# Patient Record
Sex: FEMALE | Race: WHITE
[De-identification: names, ages, dates, MRNs, and addresses within clinical notes are randomized per-mention and may not be internally consistent; named-entity substitution may affect disease eponyms.]

---

## 2019-06-07 ENCOUNTER — HOSPITAL ENCOUNTER (INPATIENT)
Dept: HOSPITAL 96 - M.ERS | Age: 79
LOS: 4 days | Discharge: NURSING, REHAB OR LONG-TERM CARE | DRG: 481 | End: 2019-06-11
Attending: INTERNAL MEDICINE | Admitting: INTERNAL MEDICINE
Payer: MEDICARE

## 2019-06-07 VITALS — BODY MASS INDEX: 26.43 KG/M2 | HEIGHT: 60.98 IN | WEIGHT: 140 LBS

## 2019-06-07 VITALS — DIASTOLIC BLOOD PRESSURE: 46 MMHG | SYSTOLIC BLOOD PRESSURE: 147 MMHG

## 2019-06-07 VITALS — SYSTOLIC BLOOD PRESSURE: 156 MMHG | DIASTOLIC BLOOD PRESSURE: 67 MMHG

## 2019-06-07 VITALS — DIASTOLIC BLOOD PRESSURE: 45 MMHG | SYSTOLIC BLOOD PRESSURE: 147 MMHG

## 2019-06-07 VITALS — DIASTOLIC BLOOD PRESSURE: 64 MMHG | SYSTOLIC BLOOD PRESSURE: 146 MMHG
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
## 2019-06-07 DIAGNOSIS — E78.00: ICD-10-CM
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
## 2019-06-07 DIAGNOSIS — D62: ICD-10-CM

## 2019-06-07 DIAGNOSIS — S92.352A: ICD-10-CM

## 2019-06-07 DIAGNOSIS — S92.332A: ICD-10-CM

## 2019-06-07 DIAGNOSIS — I95.9: ICD-10-CM

## 2019-06-07 DIAGNOSIS — Z90.49: ICD-10-CM

## 2019-06-07 DIAGNOSIS — S72.142A: Primary | ICD-10-CM

## 2019-06-07 DIAGNOSIS — S62.645A: ICD-10-CM

## 2019-06-07 DIAGNOSIS — Z88.1: ICD-10-CM

## 2019-06-07 DIAGNOSIS — E03.9: ICD-10-CM

## 2019-06-07 DIAGNOSIS — Z88.8: ICD-10-CM

## 2019-06-07 DIAGNOSIS — E11.9: ICD-10-CM

## 2019-06-07 DIAGNOSIS — I10: ICD-10-CM
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
## 2019-06-07 LAB
ABSOLUTE BASOPHILS: 0.1 THOU/UL (ref 0–0.2)
ABSOLUTE EOSINOPHILS: 0.2 THOU/UL (ref 0–0.7)
ABSOLUTE MONOCYTES: 0.8 THOU/UL (ref 0–1.2)
ALBUMIN SERPL-MCNC: 3.6 G/DL (ref 3.4–5)
ALP SERPL-CCNC: 80 U/L (ref 46–116)
ALT SERPL-CCNC: 20 U/L (ref 30–65)
ANION GAP SERPL CALC-SCNC: 8 MMOL/L (ref 7–16)
AST SERPL-CCNC: 18 U/L (ref 15–37)
BASOPHILS NFR BLD AUTO: 0.8 %
BILIRUB SERPL-MCNC: 0.3 MG/DL
BUN SERPL-MCNC: 24 MG/DL (ref 7–18)
CALCIUM SERPL-MCNC: 9.4 MG/DL (ref 8.5–10.1)
CHLORIDE SERPL-SCNC: 98 MMOL/L (ref 98–107)
CO2 SERPL-SCNC: 28 MMOL/L (ref 21–32)
CREAT SERPL-MCNC: 0.9 MG/DL (ref 0.6–1.3)
EOSINOPHIL NFR BLD: 1.7 %
GLUCOSE SERPL-MCNC: 167 MG/DL (ref 70–99)
GRANULOCYTES NFR BLD MANUAL: 69.5 %
HCT VFR BLD CALC: 34.7 % (ref 37–47)
HGB BLD-MCNC: 11.5 GM/DL (ref 12–15)
INR PPP: 1
LYMPHOCYTES # BLD: 2 THOU/UL (ref 0.8–5.3)
LYMPHOCYTES NFR BLD AUTO: 20 %
MCH RBC QN AUTO: 28.7 PG (ref 26–34)
MCHC RBC AUTO-ENTMCNC: 33.2 G/DL (ref 28–37)
MCV RBC: 86.2 FL (ref 80–100)
MONOCYTES NFR BLD: 8 %
MPV: 8.6 FL. (ref 7.2–11.1)
NEUTROPHILS # BLD: 7 THOU/UL (ref 1.6–8.1)
NUCLEATED RBCS: 0 /100WBC
PLATELET COUNT*: 287 THOU/UL (ref 150–400)
POTASSIUM SERPL-SCNC: 4.1 MMOL/L (ref 3.5–5.1)
PROT SERPL-MCNC: 8 G/DL (ref 6.4–8.2)
PROTHROMBIN TIME: 10.3 SECONDS (ref 9.2–11.5)
RBC # BLD AUTO: 4.03 MIL/UL (ref 4.2–5)
RDW-CV: 14.9 % (ref 10.5–14.5)
SODIUM SERPL-SCNC: 134 MMOL/L (ref 136–145)
TROPONIN-I LEVEL: <0.06 NG/ML (ref ?–0.06)
WBC # BLD AUTO: 10 THOU/UL (ref 4–11)

## 2019-06-07 PROCEDURE — 0QS704Z REPOSITION LEFT UPPER FEMUR WITH INTERNAL FIXATION DEVICE, OPEN APPROACH: ICD-10-PCS | Performed by: ORTHOPAEDIC SURGERY

## 2019-06-07 NOTE — NUR
PATIENT CAME TO THE FLOOR FROM THE ER VIA CART IN STABLE CONDITION. SLID
PATIENT FROM ER CART TO BED. ROOM ORIENTATION AND ADMISSION ASSESSMENT DONE.
FAMILY AT BEDSIDE. PATIENT WENT TO CT THEN WAS OING TO GO TO THE OPERATING
ROOM FROM CT. TRANSPORTED WITH PCA AND SURGEON. FAMILY IS WITH PATIENT.

## 2019-06-07 NOTE — NUR
PT TRANSFERED FROM PACU AT 2010. VITALS STABLE. ON 3L OF OXYGEN BY NC. ON
CONTINUOUS SAT MONITOR. FAMILY AT BEDSIDE. WILL CONTINUE TO MONITOR.

## 2019-06-07 NOTE — [place] [address]
Phone:  [phone]                     ELECTROCARDIOGRAM REPORT      
_______________________________________________________________________________
 
Name:       FRANDY DIAZ            Room:           [address]    ADM IN  
.R.#:  [id number]      Account #:      [id number]  
Admission:  19     Attend Phys:    ABNER Ocampo
Discharge:               Date of Birth:  [birth year]  
         Report #: [id number]
    [id number]
_______________________________________________________________________________
THIS REPORT FOR:  //name//                      
 
                         [facility] ED
                                       
Test Date:    2019               Test Time:    12:35:40
Pat Name:     FRANDY DIAZ               Department:   
Patient ID:   [id number]            Room:         [facility]
Gender:       F                        Technician:   MS
:          [id number]               Requested By: Curtis Mai
Order Number: [id number] MD:   Tip Penn
                                 Measurements
Intervals                              Axis          
Rate:         74                       P:            75
NM:           237                      QRS:          -34
QRSD:         100                      T:            111
QT:           437                                    
QTc:          485                                    
                           Interpretive Statements
Sinus rhythm
Prolonged NM interval
Left axis deviation
Abnormal R-wave progression, early transition
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
Abnormal T, consider ischemia, lateral leads
Compared to ECG 2014 11:07:31
First degree AV block now present
Left-axis deviation now present
Sinus arrhythmia no longer present
Myocardial infarct finding still present
 
Electronically Signed On 2019 16:42:09 CDT by Tip Penn
[email or web address]
 
 
 
 
 
 
 
 
 
 
 
  <ELECTRONICALLY SIGNED>
                                           By: Tip Penn MD, [facility]      
  19     1642
D: 19 1235   _____________________________________
T: 19 1235   Tip Penn MD, [facility]        /EPI

## 2019-06-08 VITALS — SYSTOLIC BLOOD PRESSURE: 143 MMHG | DIASTOLIC BLOOD PRESSURE: 54 MMHG

## 2019-06-08 VITALS — DIASTOLIC BLOOD PRESSURE: 54 MMHG | SYSTOLIC BLOOD PRESSURE: 138 MMHG

## 2019-06-08 VITALS — SYSTOLIC BLOOD PRESSURE: 143 MMHG | DIASTOLIC BLOOD PRESSURE: 59 MMHG

## 2019-06-08 VITALS — SYSTOLIC BLOOD PRESSURE: 142 MMHG | DIASTOLIC BLOOD PRESSURE: 57 MMHG

## 2019-06-08 VITALS — DIASTOLIC BLOOD PRESSURE: 62 MMHG | SYSTOLIC BLOOD PRESSURE: 129 MMHG

## 2019-06-08 LAB
HCT VFR BLD CALC: 27.3 % (ref 37–47)
HGB BLD-MCNC: 9 GM/DL (ref 12–15)

## 2019-06-08 NOTE — NUR
PT REMAINED ALERT AND ORIENTED. VITALS STABLE WITH 3L O2 BY NC. PAIN
CONTROLLED WITH OXY IR. NAUSEA RESOLVED AFTER IV ZOFRAN GIVEN. DRESSING ON LT
HIP CLEAN AND DRY. IV ANTIBIOTIC GIVEN. ICE PACK, TIMMONS IN PLACE. HOURLY
ROUNDING COMPLETED. WILL CONTINUE TO MONITOR.

## 2019-06-08 NOTE — NUR
PATIENT PLEASANT AND COOPERATIVE W/ ASSESS AND CARES. PATIENT STATES FEELING
NAUSEOUS THIS AM, SEE MAR. PATIENT DENIES NAUSEA THRU REST OF SHIFT. THERAPIES
WORK WITH PATIENT THIS SHIFT. PATIENT C/O PAIN IN LEFT FOOT, NOTED BRUISING,
PATIENT/FAMILY STATES WAS NOT PRESENT AT TIME OF FALL, DR NOTIFIED, XRAY ORDER
OBTAINED. NOTED BRUISING TO RIGHT HAND, PATIENT STATES 4TH FINGER PAINFUL.
FINGER BRENTON TAPED TO LIMIT BENDING. PATIENT STATES THIS HELPS. SEE MAR FOR
PAIN CONTROL. PATIENT TRANSFERS USING FWW/GAIT BELT W/ SBA. FAMILY MEMBERS
PRESENT IN RM THRU SHIFT. EDUCATED ON POC, INC SPIR, CDB AND MEDICATION
CHANGES. PATIENT STATES VERBALLY OF UNDERSTANDING. USING INC SPIR. LT HIP DRSG
NOTED WNL, CDI. ~MARANDARTOBI

## 2019-06-08 NOTE — NUR
SW met with pt and with pt dtr Lissy to complete initial assessment,
introduce self, and SW role.  Pt was alert, oriented, pleasant. Pt lives at
home with . Pt dtr Lissy lives in [place] and pt has another dtr
who lives in [place], [place].  Pt dtr explained that pt  is also 79 and that
"they do okay" but to be determined whether pt will need SNF or inpt rehab at
dc.  Pt open to the options if needed and also to HH at dc. (Pt does not have
hx with HH and pt  does but they do not recall the name of the agency.)
Pt has RW, cane, BSC, Shower chair and a tub/shower bench.  Pt has a ranch
style home with just 2 steps to enter and the steps from the garage into the
home is where pt fell.  Pt dtr aware pt will dc on Eliquis at least for a
couple of weeks.  SW/CM to continue to follow to assist with safe dc planning.

## 2019-06-09 VITALS — SYSTOLIC BLOOD PRESSURE: 147 MMHG | DIASTOLIC BLOOD PRESSURE: 50 MMHG

## 2019-06-09 VITALS — DIASTOLIC BLOOD PRESSURE: 48 MMHG | SYSTOLIC BLOOD PRESSURE: 116 MMHG

## 2019-06-09 VITALS — SYSTOLIC BLOOD PRESSURE: 135 MMHG | DIASTOLIC BLOOD PRESSURE: 47 MMHG

## 2019-06-09 VITALS — SYSTOLIC BLOOD PRESSURE: 124 MMHG | DIASTOLIC BLOOD PRESSURE: 61 MMHG

## 2019-06-09 VITALS — DIASTOLIC BLOOD PRESSURE: 60 MMHG | SYSTOLIC BLOOD PRESSURE: 150 MMHG

## 2019-06-09 NOTE — NUR
PATIENT HAS REMAINED ALERT AND ORIENTED X 4 THROUGHOUT THE SHIFT AND RESTING
QUIETLY ON HOURLY ROUNDS. Q2H TURNS ENCOURAGED. DRESSING LEFT HIP CLEAN AND
DRY. ICE PACK PROVIDED. MEDICATED FOR PAIN Q6H WITH SCHEDULED HYDROCODONE.
PATIENT HAS OPTED TO TAKE ONLY 1/2 TAB EACH TIME. ADEQUATE ORAL INTAKE AND
URINE OUTPUT. O2 RE-APPLIED AT HS FOR O2 SAT 88-90% ON ROOM AIR. FURTHER
CHECKS ON 2L/MIN 93-95%. OTHER VITAL SIGNS STABLE. ORTHO TO REVIEW LEFT FOOT
XRAYS IN AM WHICH DOES SHOW THIRD FIFTH METETARSAL FX OF INDETERMINATE AGE.
DAUGHTER AT BEDSIDE. CONTINUE TO MONITOR.

## 2019-06-09 NOTE — NUR
PLEASANT AND COOPERATIVE THRU SHIFT. FAMILY MEMBERS PRESENT IN  THRU MOST OF
SHIFT. SEE MAR FOR MED INTERVENTION. PATIENT TOOK OFF HAND BRACE STATING THAT
IT WAS TOO UNCOMFORTABLE, DR IN THIS MORNING FROM ORTHO GROUP, BRENTON TAPED
PATIENT FINGERS TOGETHER W/ PATIENT AGREEMENT. BOOT PLACED TO LEFT FOOT.
PATIENT INSTRUCTED BY ORTHO DR ON USE OF BOOT. PATIENT STATES VERBALLY OF
UNDERSTANDING. USING FWW AND GAIT BELT, SBA W/ TRANSFERS AND AMBULATION.
PATIENT NOTED HAVING DIFF W/ LLE. BM TODAY. IV CATH SITE NOTED WNL. CALL LIGHT
IN REACH. HRLY ROUNDS COMPLETED. ~TJRN

## 2019-06-10 VITALS — SYSTOLIC BLOOD PRESSURE: 138 MMHG | DIASTOLIC BLOOD PRESSURE: 54 MMHG

## 2019-06-10 VITALS — DIASTOLIC BLOOD PRESSURE: 66 MMHG | SYSTOLIC BLOOD PRESSURE: 156 MMHG

## 2019-06-10 VITALS — DIASTOLIC BLOOD PRESSURE: 55 MMHG | SYSTOLIC BLOOD PRESSURE: 157 MMHG

## 2019-06-10 VITALS — DIASTOLIC BLOOD PRESSURE: 65 MMHG | SYSTOLIC BLOOD PRESSURE: 172 MMHG

## 2019-06-10 VITALS — DIASTOLIC BLOOD PRESSURE: 56 MMHG | SYSTOLIC BLOOD PRESSURE: 170 MMHG

## 2019-06-10 LAB
ABSOLUTE BASOPHILS: 0.1 THOU/UL (ref 0–0.2)
ABSOLUTE EOSINOPHILS: 0.1 THOU/UL (ref 0–0.7)
ABSOLUTE MONOCYTES: 1 THOU/UL (ref 0–1.2)
ANION GAP SERPL CALC-SCNC: 9 MMOL/L (ref 7–16)
BASOPHILS NFR BLD AUTO: 0.5 %
BUN SERPL-MCNC: 16 MG/DL (ref 7–18)
CALCIUM SERPL-MCNC: 9.1 MG/DL (ref 8.5–10.1)
CHLORIDE SERPL-SCNC: 95 MMOL/L (ref 98–107)
CO2 SERPL-SCNC: 30 MMOL/L (ref 21–32)
CREAT SERPL-MCNC: 1 MG/DL (ref 0.6–1.3)
EOSINOPHIL NFR BLD: 0.5 %
GLUCOSE SERPL-MCNC: 288 MG/DL (ref 70–99)
GRANULOCYTES NFR BLD MANUAL: 77 %
HCT VFR BLD CALC: 27.7 % (ref 37–47)
HGB BLD-MCNC: 9.2 GM/DL (ref 12–15)
LYMPHOCYTES # BLD: 1.2 THOU/UL (ref 0.8–5.3)
LYMPHOCYTES NFR BLD AUTO: 12.5 %
MCH RBC QN AUTO: 28.7 PG (ref 26–34)
MCHC RBC AUTO-ENTMCNC: 33.2 G/DL (ref 28–37)
MCV RBC: 86.6 FL (ref 80–100)
MONOCYTES NFR BLD: 9.5 %
MPV: 8.8 FL. (ref 7.2–11.1)
NEUTROPHILS # BLD: 7.7 THOU/UL (ref 1.6–8.1)
NUCLEATED RBCS: 0 /100WBC
PLATELET COUNT*: 252 THOU/UL (ref 150–400)
POTASSIUM SERPL-SCNC: 4.1 MMOL/L (ref 3.5–5.1)
RBC # BLD AUTO: 3.2 MIL/UL (ref 4.2–5)
RDW-CV: 14.5 % (ref 10.5–14.5)
SODIUM SERPL-SCNC: 134 MMOL/L (ref 136–145)
WBC # BLD AUTO: 10 THOU/UL (ref 4–11)

## 2019-06-10 NOTE — NUR
ASSUMED CARE OF PATIENT AT 1515. PATIENT RESTING IN BED. PATIENT IS UP WITH
MINIMAL ASSIST WITH WALKER/GAITBELT FOR TRANSFER. PATIENT HAS HAD COMPLAINTS
OF PAIN, TREATED ADEQUATELY WITH HYDROCODONE. PATIENT HAS GOOD APPETITE.
PATIENT DENIES ANY NEEDS AT THIS TIME. CALL LIGHT WITHIN REACH. WILL CONTINUE
TO MONITOR.

## 2019-06-10 NOTE — NUR
PATIENT IS ALERT AND ORIENTED TODAY VERY PLEASANT. VITAL SIGNS STABLE ON ROOM
AIR. SOME COMPLAINTS OF PAIN THAT IS WELL CONTROLLED WITH ORAL PAIN
MEDICATIONS. UP WITH WALKER AND GAIT BELT TO BEDSIDE COMMODE, TOERATING
THERAPY WELL. WILL PASS ON REPORT TO NEXT NURSE AND CONTIMUE TO MONITOR.

## 2019-06-10 NOTE — NUR
AWAITNG 'S VISIT. DISCUSSED WITH PT.PLAN B. IF DOES NOT FEEL SHE
WOULOD BE A GOOD REHAB CADIDATE, SHE WOULD NEED TO GO TO SNF. GAVE HER
PAMPHLET OF SNFS IN [place]. SHE WILL LOOK AT IT AND WANTS TO TALK WITH HER
FAMILY. TOLD HER SHE COULD POSSIBLY BE DISCHARGED TOMORROW EITHER TO REAHB OR
SNF.

## 2019-06-10 NOTE — NUR
PATIENT HAS REMAINED ALERT AND ORIENTED X 4 THROUGHOUT THE SHIFT AND RESTING
QUIETLY ON HOURLY ROUNDS. MEDICATED FOR PAIN X 2 TO GOOD EFFECT. DRESSING LEFT
HIP CLEAN AND DRY. REFUSED ICE PACK. DOMENICO HOSE AND SCD'S ON BILAT LE'S.
REFUSED SEVERAL Q2H TURNS. DID GET UP OUT OF BED X 2 TO BSC FOR VOIDS. ABLE TO
MAKE SMALL ADJUSTMENTS BY SELF. USING GAIT BELT PER PT INSTRUCTION TO ASSIST
MOVING LLE FROM SUPINE TO SITTING POSITION. ACROSS BED. MIN ASSIST WITH
WALKER, GAIT BELT AND POST-OP SHOE TO L FOOT TO COMMODE.  VITAL SIGNS STABLE.
CONTINUE TO MONITOR.

## 2019-06-11 ENCOUNTER — HOSPITAL ENCOUNTER (INPATIENT)
Dept: HOSPITAL 96 - M.REH | Age: 79
LOS: 10 days | Discharge: HOME HEALTH | DRG: 536 | End: 2019-06-21
Attending: PHYSICAL MEDICINE & REHABILITATION | Admitting: PHYSICAL MEDICINE & REHABILITATION
Payer: MEDICARE

## 2019-06-11 VITALS — DIASTOLIC BLOOD PRESSURE: 59 MMHG | SYSTOLIC BLOOD PRESSURE: 151 MMHG

## 2019-06-11 VITALS — SYSTOLIC BLOOD PRESSURE: 152 MMHG | DIASTOLIC BLOOD PRESSURE: 49 MMHG

## 2019-06-11 VITALS — HEIGHT: 60.98 IN | WEIGHT: 132 LBS | BODY MASS INDEX: 24.92 KG/M2

## 2019-06-11 VITALS — DIASTOLIC BLOOD PRESSURE: 49 MMHG | SYSTOLIC BLOOD PRESSURE: 152 MMHG

## 2019-06-11 DIAGNOSIS — F17.210: ICD-10-CM
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
## 2019-06-11 DIAGNOSIS — E83.42: ICD-10-CM
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
## 2019-06-11 DIAGNOSIS — Z79.899: ICD-10-CM

## 2019-06-11 DIAGNOSIS — S72.142A: Primary | ICD-10-CM

## 2019-06-11 DIAGNOSIS — E11.9: ICD-10-CM

## 2019-06-11 DIAGNOSIS — S92.332A: ICD-10-CM

## 2019-06-11 DIAGNOSIS — I10: ICD-10-CM

## 2019-06-11 DIAGNOSIS — Z88.8: ICD-10-CM

## 2019-06-11 DIAGNOSIS — E03.9: ICD-10-CM

## 2019-06-11 DIAGNOSIS — Z79.84: ICD-10-CM
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
## 2019-06-11 DIAGNOSIS — Z88.1: ICD-10-CM
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
## 2019-06-11 DIAGNOSIS — E78.00: ICD-10-CM

## 2019-06-11 DIAGNOSIS — S92.344A: ICD-10-CM

## 2019-06-11 DIAGNOSIS — Z90.49: ICD-10-CM

## 2019-06-11 NOTE — PATH
[address]  [address]                    PATHOLOGY RPT PROCEDURE       
_______________________________________________________________________________
 
Name:       FRANDY DIAZ            Room:           [facility] [place]  
.R.#:  [id number]      Account #:      [id number]  
Admission:  06/07/19     Date of Birth:  [birth year]  
Discharge:                             Report #:    [id number]
                                                         Path Case #: [id number]
_______________________________________________________________________________
 
[facility] Accession Number: [id number]
.                                                                01
Material submitted:                                        .
femur - LEFT FEMUR BONE REAMINGS. Modifiers: left
.                                                                01
Clinical history:                                          .
Left convoluted intertrochanteric, subtrochanteric fracture
.                                                                02
**********************************************************************
Diagnosis:
Femur bone reamings:
- Fragments of benign and viable bone, marrow fat/stroma, skeletal muscle,
cartilage and fibrous connective tissue.
(ARIN:pit 06/11/2019)
QTP/06/11/2019
**********************************************************************
.                                                                02
Electronically signed:                                     .
Luther Elias MD, Pathologist
NPI- [id number]
.                                                                01
Gross description:                                         .
Received in formalin labeled "Diaz, Frandy, femur bone reamings," and
additionally labeled on the requisition as, "left convoluted
infratrochanteric, subtrochanteric fracture," is a 4.2 x 2.3 x 0.5 cm
aggregate of granular, tan-brown bone fragments.  The specimen is
submitted representatively in cassette A1, following decalcification.
(DAC; 6/10/2019)
XDC/XDC
.                                                                02
Pathologist provided ICD-10:
S72.102A
.                                                                02
CPT                                                        .
859890, 442887
Specimen Comment: A courtesy copy of this report has been sent to
Specimen Comment: [phone], [phone], [phone].
Specimen Comment: Report sent to ,DR BARCENAS / DR MOBLEY
***Performed at:  01
   [facility]
   [address] [place] [address] [address], [place], [place]  [id number]
   MD Jj Loaiza MD Phone:  [phone]
***Performed at:  02
   [facility] [address], [place], [place]  [id number]
   MD Luther Elias MD Phone:  [phone]

## 2019-06-11 NOTE — NUR
PT REMAINED ALERT AND ORIENTED. VITALS STABLE RA. MEDS GIVEN AS ORDERED. PAIN
CONTROLLED WITH 1/2 TAB OF HYDROCODONE AS PT REQUESTED. NO NAUSEA OR VOMING
THIS SHIFT. DRESSING ON LT HIP CLEAN AND DRY. PT UP TO THE COMMODE WITH WALKER
AND GAITBELT. HOURLY ROUNDING COMPLETED. WILL CONTINUE TO MONITOR.

## 2019-06-11 NOTE — NUR
NOTIFIED BY [facility] THAT THEY CAN ACCEPT PT.TODAY TO REHAB UNIT. CM
NOTIFIED JB HERNANDEZ AND PT. NURSING TO FAX ORDERS AND MED REQ WHEN COMPLETE.

## 2019-06-11 NOTE — NUR
AM ASSESSMENT AND VITAL SIGNS COMPLETED AS DOCUMENTED. PT HAS WORKED WITH PT
AND OT, AMBULATES WITH A WALKER ANDMIN ASSIST. PAIN HAS BEEN WELL MANAGED WITH
ORAL MEDICATIONS. DRESSING TO THE LEFT HIP REMAINS C/D/I. PT HAS BEEN ACCEPTED
TO THE INPATIENT REHAB UNIT.  PT AND HER BELONGINGS TRANSPORTED TO ROOM 324
AND SHE HAS BEEN DISCHARGED FROM [facility].

## 2019-06-12 VITALS — DIASTOLIC BLOOD PRESSURE: 72 MMHG | SYSTOLIC BLOOD PRESSURE: 144 MMHG

## 2019-06-12 LAB
ANION GAP SERPL CALC-SCNC: 9 MMOL/L (ref 7–16)
BUN SERPL-MCNC: 19 MG/DL (ref 7–18)
CALCIUM SERPL-MCNC: 9 MG/DL (ref 8.5–10.1)
CHLORIDE SERPL-SCNC: 101 MMOL/L (ref 98–107)
CO2 SERPL-SCNC: 29 MMOL/L (ref 21–32)
CREAT SERPL-MCNC: 0.8 MG/DL (ref 0.6–1.3)
GLUCOSE SERPL-MCNC: 144 MG/DL (ref 70–99)
HCT VFR BLD CALC: 25 % (ref 37–47)
HGB BLD-MCNC: 8.6 GM/DL (ref 12–15)
MCH RBC QN AUTO: 29.5 PG (ref 26–34)
MCHC RBC AUTO-ENTMCNC: 34.3 G/DL (ref 28–37)
MCV RBC: 86 FL (ref 80–100)
MPV: 9.1 FL. (ref 7.2–11.1)
PLATELET COUNT*: 293 THOU/UL (ref 150–400)
POTASSIUM SERPL-SCNC: 4.3 MMOL/L (ref 3.5–5.1)
RBC # BLD AUTO: 2.9 MIL/UL (ref 4.2–5)
RDW-CV: 14.4 % (ref 10.5–14.5)
SODIUM SERPL-SCNC: 139 MMOL/L (ref 136–145)
WBC # BLD AUTO: 9.4 THOU/UL (ref 4–11)

## 2019-06-12 NOTE — NUR
ASSUMED PT CARE AT 1930.  PT ALERT AND ORIENTED X4, POLITE AND COOPERATIVE
WITH CARES.  ADMISSION DATABASES AND PAPERWORK COMPLETED.  UP TO BATHROOM WITH
SBA, GAIT BELT AND WALKER.  PT WEARS ORTHO SHOE TO LEFT FOOT WHEN AMBULATING.
STOOL X2 THIS SHIFT.  DRESSING TO LEFT HIP C/D/I. TEGADERM TO SKIN TEAR ON
LEFT HAND INTACT.  PAIN MEDICATION ONCE THIS SHIFT. USES CALL LIGHT
APPROPRIATELY.  HOURLY ROUNDING IN PROGRESS, WILL CONTINUE TO MONITOR.

## 2019-06-12 NOTE — NUR
PATIENT IS ALERT AND ORIENTED X 4, UP WITH MODERATE TO LIMITED ASSISTANCE.
USES ROLLING WALKER. PATIENT PARTICIPATING IN THERAPIES. SHE AMBULATES TO/FROM
THE [facility] AREA. CONT. WITH CURRENT PLAN OF CARE.

## 2019-06-12 NOTE — NUR
Nutrition: Pt admitted to rehab with Lt hip FX. Wt: 131#. Eating regular diet
well. H/o DM, HTN. BG is elevated 251. Albumin 3.6. RX: metformin,
glimeperide. GOAL: better BG control. RD will restrict diet to CHO controlled.
Will follow weekly.

## 2019-06-12 NOTE — NUR
PATIENT UP WITH ASSIST. AMBULATES WITH ROLLING WALKER. DENIES COMPLAINTS OR
PAIN OR DISCOMFORT. CONT. WITH PLAN OF CARE.

## 2019-06-12 NOTE — OP
[address]  [address]                    OPERATIVE REPORT              
_______________________________________________________________________________
 
Name:       FRANDY DIAZ            Room:           [address] [place]  
M.R.#:  [id number]      Account #:      [id number]  
Admission:  06/07/19     Attend Phys:    BANER Ocampo
Discharge:  06/11/19     Date of Birth:  [birth year]  
         Report #: [id number]
                                                                     [id number]  
_______________________________________________________________________________
THIS REPORT FOR:  //name//                      
 
CC: Roberta Ordonez
 
DATE OF SERVICE:  06/07/2019
 
 
This is Dr. Shannon More dictating an operative report for Dr. Tyson Parada.
 
PREOPERATIVE DIAGNOSIS:  Left comminuted displaced closed intertrochanteric
femur fracture.
 
POSTOPERATIVE DIAGNOSIS:  Left comminuted displaced closed intertrochanteric
femur fracture.
 
SURGEON:  Tyson Parada DO
 
FIRST ASSISTANT:  Shanonn More DO and Jorge Wagner DO.
 
PROCEDURE PERFORMED:  Open reduction and internal fixation of left hip fracture
with long cephalomedullary nail.
 
ANESTHESIA:  General and local.
 
ESTIMATED BLOOD LOSS:  250 mL.
 
SPECIMENS REMOVED:  Femoral canal reaming sent to pathology.
 
COMPLICATIONS:  None.
 
DISPOSITION:  Stable to PACU.
 
ORTHOPEDIC IMPLANTS:  Melrose gamma nail 11 x 340, then 35 and 40 mm distal
interlock screws and 90 mm lag screw.
 
INDICATION FOR PROCEDURE:  The patient is a pleasant 79-year-old female who
unfortunately suffered a ground level fall earlier today, resulting in immediate
left hip pain and inability to bear weight on her left lower extremity.  She
presented to [facility] ED via EMS and imaging revealed a left comminuted
displaced closed intertrochanteric femur fracture.  We recommended open
reduction and internal fixation of her left hip fracture with a long
cephalomedullary nail.  All the risks, benefits, complications and alternatives
of surgery were discussed with the patient and her family and they wished to
proceed with surgery.
 
 
 
 
[place] [address]                    OPERATIVE REPORT              
_______________________________________________________________________________
 
Name:       DIAZFRANDYEUSEBIA VILLASENOR            Room:           [address]#:  [id number]      Account #:      [id number]  
Admission:  06/07/19     Attend Phys:    ABNER Ocampo
Discharge:  06/11/19     Date of Birth:  [birth year]  
         Report #: [id number]
                                                                     [id number]  
_______________________________________________________________________________
DESCRIPTION OF PROCEDURE:  The patient was seen in the preoperative suite. 
Consent was obtained.  One gram of IV Ancef was administered.  The operative
site was marked and everyone in the preop suite agreed this was the correct
site.  She was transported to the operative suite and given the benefit of
general anesthesia.  She was then transferred to a well-padded [place] table in the
supine position.  Her left foot and ankle were wrapped in soft roll and then her
left foot was placed in a well-padded [place]cross traction boot and connected to the
[place] table.  Her right lower extremity was then carefully flexed and abducted
and placed in a well-padded leg escalante.  Attention was then turned to
preoperative reduction of her fracture.  The left lower extremity was internally
rotated and traction was applied.  C-arm was utilized to confirm reduction of
her fracture on both AP and lateral imaging.  The left lower extremity was then
prepped and draped in the typical sterile fashion.
 
A timeout was performed to confirm the correct patient, operative site and
procedure.  Everyone in the operative suite agreed.  The greater trochanter and
axis of the femur were marked out.  A 3 cm incision was then made just proximal
to the tip of the greater trochanter over the lateral aspect of the hip through
skin and IT band.  A guidewire was then utilized to find the starting point at
the tip of the greater trochanter that was confirmed by AP and lateral C-arm
imaging.  A starting awl was then taken down to the level of the lesser
trochanter.  A ball-tipped guidewire was then passed down through the awl to the
level of the knee that was confirmed to be in the femoral canal via C-arm
imaging.
 
Once the ball tip guidewire was confirmed to be in appropriate position, the awl
was removed and a size 11 reamer on power was then used as a starting reamer. 
We sequentially reamed up to a 13.  Reamings were collected and sent to
pathology for further analysis.  A starting reamer 15.5 was then taken down to
the level of the lesser trochanter.  The nail, 11 x 340 was then passed over the
ball tip guidewire and assessed to be in correct position by AP and lateral
C-arm imaging.  Our attention was then turned to lag screw placement.  The
triple sleeve was placed through the 125 degree lag screw portal and the guide. 
A 2 cm incision was made through skin and IT band.  A new triple sleeve was
advanced to bone.  A guidewire on power was advanced into the correct position
in the femoral head and confirmed with C-arm AP and lateral imaging.  Measuring
guide was used and a 90 mm lag screw was deemed appropriate.  The drill on power
was set to 90 mm and drilled over the guidewire.  The 90 mm lag screw was then
placed using a hand screwdriver and confirmed to be in appropriate position via
fluoroscopic imaging, the set screw was then inserted, tightened and loosened a
quarter turn.  Compression was then achieved with a lag screw sleeve and
confirmed by imaging.  The triple sleeve and nail guide were then removed.
 
Our attention was then turned to the distal interlocks.  The C-arm was utilized
to visualize perfect circles.  A 2 cm incision was made at the level of the
distal interlocks through skin and IT band.  A drill on power was used to drill
 
 
 
[facility] 
[address] [place] [address]                    OPERATIVE REPORT              
_______________________________________________________________________________
 
Name:       FRANDY DIAZ            Room:           [address] [place]  
.R.#:  [id number]      Account #:      [id number]  
Admission:  06/07/19     Attend Phys:    ABNER Ocampo
Discharge:  06/11/19     Date of Birth:  [birth year]  
         Report #: [id number]
                                                                     [id number]  
_______________________________________________________________________________
the interlock screw holes, confirmed to be through the nail with C-arm imaging. 
A depth gauge was used and 35 and 40 mm screws were deemed appropriate for the
distal interlocks.  The screws were placed on power and finished with a hand
screwdriver.  AP and lateral fluoroscopic images confirmed the screws to be in
the appropriate position and length.  Final fluoroscopic images were obtained
confirming all the hardware to be in appropriate position and alignment and
confirmed no retained orthopedic instruments.
 
The incisions were thoroughly irrigated with sterile water.  The IT band was
closed with 1-0 Vicryl.  Subcutaneous tissues were closed with 2-0 Vicryl and
the skin was then stapled.  A 30 mL of orthopedic cocktail were injected.  Skin
was cleaned with wet and dry laps and dressed with Mepilex.  All counts were
correct x 2.  The patient was awoken from general anesthesia and transported to
the PACU in stable condition.
 
The patient will be given 2 more doses of IV Ancef 1 gram q.8h. postoperatively.
 She will receive anticoagulation, both mechanical and pharmacological while
admitted.  She will be weightbear as tolerated to her left lower extremity.  She
will continue Eliquis 2.5 mg b.i.d. for 2 weeks and then transition to aspirin
325 mg b.i.d. for 4 weeks.  Staples will be removed at 14 days and the patient
will follow up with Dr. Parada in clinic in 2 weeks.
 
 
 
 
 
 
 
 
 
 
 
 
 
 
 
 
 
 
 
 
 
 
 
<ELECTRONICALLY SIGNED>
                                        By:  Chema Vasquez DO              
06/12/19     1244
D: 06/08/19 0810_______________________________________
T: 06/08/19 1028Tyson Parada DO               /nt

## 2019-06-12 NOTE — NUR
SW completed initial assessment on inpt rehab unit.  Pt alert, oriented,
pleasant. Pt known to SW from pt stay on acute care.  Pt lives at home with
.  Pt has supportive dtrs.  Pt has RW, cane, BSC, shower chair, shower
tub bench.  Pt lives in a ranch style home with 2 steps to enter.  Pt might be
on Eliquis at dc.  SW to continue to follow to assist with safe dc planning.
SW and Dr Wilson met with pt to review team conference summary and plan to
reteam.  Pt in agreement with plan.

## 2019-06-13 VITALS — SYSTOLIC BLOOD PRESSURE: 141 MMHG | DIASTOLIC BLOOD PRESSURE: 48 MMHG

## 2019-06-13 VITALS — DIASTOLIC BLOOD PRESSURE: 52 MMHG | SYSTOLIC BLOOD PRESSURE: 112 MMHG

## 2019-06-13 NOTE — NUR
PATIENT ALERT AND ORIENTED X 4, UP WITH SBA. DENIES PAIN AT THIS TIME. PAIN
MEDS GIVEN PRIOR TO THIS SHIFT. WILL REASSES. CONT. WITH CURRENT PLAN OF CARE.

## 2019-06-13 NOTE — NUR
ASSUMED PT CARE AT 1930.  PT ALERT AND ORIENTED X4, POLITE AND COOPERATIVE
WITH CARES.  PT UP TO BATHROOM SEVERAL TIMES OVERNIGHT TO VOID WITH SBA, GAIT
BELT, WALKER AND ORTHO SHOE TO LEFT FOOT.  PT CONTINUES TO NEED ASSIST WITH
GETTING LEFT LEG IN AND OUT OF BED.  NO STOOL THIS SHIFT.  DRESSING TO
LEFT HIP C/D/I.  PT C/O PAIN TO LEFT HIP, PAIN MEDICATION TWICE THIS SHIFT.
USES CALL LIGHT APPROPRIATELY.  CALL LIGHT AND FREQUENTLY USED ITEMS WITHIN
REACH.  BED ALARM ON FOR SAFETY.  HOURLY ROUNDING IN PROGRESS, WILL CONTINUE
TO MONITOR.

## 2019-06-13 NOTE — NUR
SITTING UP IN CHAIR WATCHING TV.  AMBULATED TO THE BATHROOM WITH SBA,
GAITBELT, WALKER, POST OP SHOE ON LEFT FOOT.  DOES OWN HYGIENE AND CLOTHING
ADJUSTMENTS.  NEEDED MINIMAL ASSIST WITH LIFTING LEFT LEG INTO BED.  LEFT HIP
DRESSING DRY/INTACT.  TYLENOL GIVEN PER REQUEST FOR COMPLAINT OF LEFT LEG PAIN
RATED "4".  CALL LIGHT WITHIN REACH.

## 2019-06-14 VITALS — SYSTOLIC BLOOD PRESSURE: 146 MMHG | DIASTOLIC BLOOD PRESSURE: 47 MMHG

## 2019-06-14 VITALS — DIASTOLIC BLOOD PRESSURE: 53 MMHG | SYSTOLIC BLOOD PRESSURE: 146 MMHG

## 2019-06-14 VITALS — SYSTOLIC BLOOD PRESSURE: 130 MMHG | DIASTOLIC BLOOD PRESSURE: 54 MMHG

## 2019-06-14 VITALS — DIASTOLIC BLOOD PRESSURE: 49 MMHG | SYSTOLIC BLOOD PRESSURE: 135 MMHG

## 2019-06-14 NOTE — NUR
ASSUMED CARE AT 0730.  ALERT ORIENTED PLEASANT COOPERATIVE.  HX OF L HIP FX
WBAT LLE WEARS POST OP SHOE WITH AMBULATION.   MEDICATED X 1 WITH TYLENOL 2
TABS PO L HIP PAIN..  PARTICIPATING IN THERAPIES THROUGHOUT THE DAY.  STATES
SOME DIZZINESS LAST 2 DAYS SOME TIMES WITH MOVEMENT AND SOMETIMES WITH SITTING
STILL GAVE LISINOPRIL THIS A.M. BUT HELD NORVASC SEE GRAPHICS.  DR. STALEY
AWARE AND OKAY WITH HOLDING MED TODAY.  MOVED  THIS AFTERNOON WITH ALL
BELONGINGS.  FEEDS SELF TAKES MEDS WITHOUT DIFFICULTY.

## 2019-06-14 NOTE — NUR
UP AT 0130 TO THE BATHROOM TO VOID.  TYLENOL GIVEN AT 0138 PER REQUEST FOR
COMPLAINT OF LEFT HIP PAIN RATED "4" WITH GOOD RESULTS.  CURRENTLY SLEEPING
SOUNDLY AND SNORING LOUDLY.  HOURLY ROUNDING IN PROGRESS.

## 2019-06-15 VITALS — SYSTOLIC BLOOD PRESSURE: 114 MMHG | DIASTOLIC BLOOD PRESSURE: 42 MMHG

## 2019-06-15 VITALS — SYSTOLIC BLOOD PRESSURE: 158 MMHG | DIASTOLIC BLOOD PRESSURE: 48 MMHG

## 2019-06-15 VITALS — SYSTOLIC BLOOD PRESSURE: 114 MMHG | DIASTOLIC BLOOD PRESSURE: 41 MMHG

## 2019-06-15 NOTE — NUR
PATIENT WORKING WITH THERAPY THIS SHIFT. UP WITH SBA AND GAIT BELT WITH
WALKER. C/O HIP PAIN THIS EVENING, PRN FLEXERIL GIVEN AS ORDERED. PATIENT
NOTED TO BE RESTING WITH EYES CLOSED IN BED AFTER GIVEN. ORAL AGENTS GIVEN AS
ORDERED FOR BLOOD GLUCOSE.

## 2019-06-15 NOTE — NUR
ASSUMED CARE AT 1930. PATIENT RESTING IN RECLINER UNTIL AROUND 2100. UP WITH
SBA, GAIT BELT, WALKER. WEARS POST OP SHOE TO LT FOOT WHILE AMBULATING.  VOIDS
PER TOILET. TAKES PILLS WITH WATER. NO C/O PAIN.  LT HIP DRESSING C/D/I. TURNS
SELF. HOURLY ROUNDS CONTINUE. BED ALARL ON. CALL LITE IN REACH.

## 2019-06-15 NOTE — NUR
ALTHOUGH PATIENT WAS RESTING QUIETLY IN BED, SHE C/O THAT SHE WAS SHAKY. BLOOD
PRESSURE CHECKED, SEE INTERVENTION, BLOOD SUGAR CHECKED, WNL. STATES AT HOME
SHE TAKES SOMETHING STARTING WITH AN "L"  AND AFFIRMED THAT IT WAS LORAZEPAM.
THIS GIVEN PER ORDER, SEE MAR. RETURNED TO SEMIFOWLER'S POSITIONS.

## 2019-06-15 NOTE — NUR
SLEPT MOST OF THE NIGHT. VOIDED PER TOILET. UP WITH SBA, GAIT BELT AND WALKER.
MEDICATED FOR PAIN ONCE, SEE MAR. TURNS SELF. HOURLY ROUNDS CONTINUE. BED
ALARM ON. CALL LITE IN REACH.

## 2019-06-16 VITALS — SYSTOLIC BLOOD PRESSURE: 129 MMHG | DIASTOLIC BLOOD PRESSURE: 40 MMHG

## 2019-06-16 VITALS — DIASTOLIC BLOOD PRESSURE: 52 MMHG | SYSTOLIC BLOOD PRESSURE: 133 MMHG

## 2019-06-16 LAB
ANION GAP SERPL CALC-SCNC: 7 MMOL/L (ref 7–16)
BUN SERPL-MCNC: 26 MG/DL (ref 7–18)
CALCIUM SERPL-MCNC: 8.8 MG/DL (ref 8.5–10.1)
CHLORIDE SERPL-SCNC: 101 MMOL/L (ref 98–107)
CO2 SERPL-SCNC: 30 MMOL/L (ref 21–32)
CREAT SERPL-MCNC: 0.8 MG/DL (ref 0.6–1.3)
GLUCOSE SERPL-MCNC: 128 MG/DL (ref 70–99)
MAGNESIUM SERPL-MCNC: 1.4 MG/DL (ref 1.8–2.4)
POTASSIUM SERPL-SCNC: 4.2 MMOL/L (ref 3.5–5.1)
SODIUM SERPL-SCNC: 138 MMOL/L (ref 136–145)

## 2019-06-16 NOTE — NUR
PATIENT UP IN ROOM WITH SBA AND TO DINING HICKS FOR MEALS. UP WITH SBA AND USE
OF GAIT BELT AND WALKER. BM NOTED TODAY. NO COMPLAINTS OF PAIN. FAMILY HERE
VISITING THIS AFTERNOON. MG REPLACED PER PROTOCOL FOR VALUE OF 1.4, REDRAW AT
1900.

## 2019-06-16 NOTE — NUR
ASSUMED PT CARE AT 1930.  PT SITTING UP IN RECLINER WATCHING TELEVISION UNTIL
2100.  PT UP WITH SBA, GAIT BELT AND WALKER TO BATHROOM TO VOID.  PT WEARS
POST OP SHOE ON LEFT FOOT WHEN AMBULATING.  TAKES PILLS WHOLE WITH WATER
WITHOUT DIFFICULTY.  DENIES PAIN. DRESSING TO LT HIP C/D/I.  PT TURNS SELF IN
BED. PT SLEPT WELL OVERNIGHT.  BED ALARM ON FOR SAFETY.  CALL LIGHT AND
FREQUENTLY USED ITEMS WITHIN REACH.  HOURLY ROUNDING IN PROGRESS, WILL
CONTINUE TO MONITOR.

## 2019-06-17 VITALS — SYSTOLIC BLOOD PRESSURE: 127 MMHG | DIASTOLIC BLOOD PRESSURE: 39 MMHG

## 2019-06-17 VITALS — SYSTOLIC BLOOD PRESSURE: 145 MMHG | DIASTOLIC BLOOD PRESSURE: 48 MMHG

## 2019-06-17 NOTE — NUR
ASSUMED PT CARE AT 1930.  PT ALERT AND ORIENTED X4, POLITE AND COOPERATIVE
WITH CARES.  SITTING UP IN RECLINER WATCHING TELEVISION. UP WITH SBA, GAIT
BELT, WALKER AND POST OP SHOE TO BATHROOM TO VOID.  DENIES PAIN.  DRESSING TO
LEFT HIP C/D/I.  PT TURNS SELF IN BED. SLEPT WELL OVERNIGHT.  BED ALARM ON FOR
SAFETY.  CALL LIGHT AND FREQUENTLY USED ITEMS WITHIN REACH.  HOURLY ROUNDING
IN PROGRES, WILL CONTINUE TO MONITOR.

## 2019-06-17 NOTE — NUR
ASSUMED CARE AT 0730.  ALERT ORIENTED PLEASANT COOPERATIVE.  HX OF L HIP FX.
WBATLLE.  TRANSFERS WITH SBA G BELT WALKER.

## 2019-06-17 NOTE — NUR
PARTICIPATING IN THERAPIES THROUGHOUT THE DAY.  APPETITE GOOD FEEDS SELF TAKES
MEDS WITHOUT DIFFICULTY.  UP IN RECLINER WHEN NOT IN THERAPIES.

## 2019-06-18 VITALS — SYSTOLIC BLOOD PRESSURE: 107 MMHG | DIASTOLIC BLOOD PRESSURE: 37 MMHG

## 2019-06-18 VITALS — SYSTOLIC BLOOD PRESSURE: 142 MMHG | DIASTOLIC BLOOD PRESSURE: 48 MMHG

## 2019-06-18 NOTE — NUR
ASSUMED CARE AT 0730.  ALERT ORIENTED PLEASANT COOPERATIVE.  HX OF L HIP FX
WBATLLE DRESSING C/D/I.  AMBULATES TO BR TO VOID ABLE TO DO HYGEINE AND
CLOTHING ADJUSTMENTS.  FEEDSS SELF TAKES MEDS WITHOUT DIFFICULTY.
PARTICIPATING IN THERAPIES.  SITTING IN RECLINER AT BEDSIDE WHEN NOT IN
THERAPIES.  USES CALL LIGHT APPROPRIATELY FOR ASSIST.

## 2019-06-18 NOTE — NUR
ASSUMED PT CARE AT 1930.  PT ALERT AND ORIENTED X4, POLITE AND COOPERATIVE
WITH CARES. PT UP WITH SBA, GAIT BELT, WALKER, AND ORTHO SHOE TO LEFT FOOT. UP
ONCE TO BATHROOM TO VOID. PT ABLE TO LIFT BOTH LEGS INTO BED UNASSISTED.
TYLENOL AT HS FOR RIGHT HIP PAIN RATED 3/10.  PT SLEPT WELL OVERNIGHT.  USES
CALL LIGHT APPROPRIATELY.  BED ALARM ON FOR SAFETY.  HOURLY ROUNDING IN
PROGRESS, WILL CONTINUE TO MONITOR.

## 2019-06-18 NOTE — NUR
SITTING UP IN RECLINER WATCHING TV.  HAS POST OP SHOE ON LEFT FOOT.  2+ LEFT
ANKLE EDEMA NOTED.  TYLENOL GIVEN PER REQUEST FOR COMPLAINT OF LEFT HIP PAIN
RATED "3".  AMBULATED TO THE BATHROOM WITH SBA, GAITBELT, WALKER.  HAD A
MODERATE BM.  DID OWN HYGIENE AND CLOTHING ADJUSTMENTS.

## 2019-06-19 VITALS — DIASTOLIC BLOOD PRESSURE: 60 MMHG | SYSTOLIC BLOOD PRESSURE: 137 MMHG

## 2019-06-19 VITALS — SYSTOLIC BLOOD PRESSURE: 129 MMHG | DIASTOLIC BLOOD PRESSURE: 51 MMHG

## 2019-06-19 NOTE — NUR
AM ASSESSMENT AND VITAL SIGNS COMPLETED AS DOCUMENTED. PT HAS PARTICIPATED IN
ALL THERAPIES, DENIES NEED FOR PAIN MEDICATION. PT HAS BEEN COMPLIANT WITH
WEARING THE POST OP SHOE WHEN OUT OF BED. FALL PRECAUTIONS AND HOURLY ROUNDING
CONTINUE.

## 2019-06-19 NOTE — NUR
GLEN and Dr Wilson met with pt and pt dtr to review team conference summary and
plan for pt to dc home on Monday however the pt was very upset by this and
determination was made that pt would be able to dc on Friday instead after a
couple more days of therapy to ensure pt readiness to dc home safely.  Pt
still said she wished she could dc today or tomorrow but agreed to Friday at
this time.  Pt has needed DME.  GLEN to discuss with pt HH choices and arrange
for dc.

## 2019-06-19 NOTE — NUR
UP X ONE DURING THE NIGHT TO THE BATHROOM TO VOID.  NO FURTHER COMPLAINT OF
PAIN.  HOURLY ROUNDING IN PROGRESS.

## 2019-06-20 VITALS — SYSTOLIC BLOOD PRESSURE: 82 MMHG | DIASTOLIC BLOOD PRESSURE: 41 MMHG

## 2019-06-20 VITALS — SYSTOLIC BLOOD PRESSURE: 135 MMHG | DIASTOLIC BLOOD PRESSURE: 55 MMHG

## 2019-06-20 NOTE — NUR
SITTING UP IN RECLINER WATCHING TV.   SITTING IN CHAIR NEXT TO PATIENT.
DENIES DISCOMFORT.  MODIFIED INDEPENDENTIN ROOM WITH A WALKER.  WEARS A
POST-OP SHOE ON LEFT FOOT.

## 2019-06-20 NOTE — NUR
SW met with pt to discuss dc planning for tomorrow and HH services to follow.
SW provided options and pt was not sure of her preference yet but would
provide choice to SW tomorrow prior to pt dc.  SW reviewed IMM and pt signed,
copy in pt chart.  Pt has needed DME already.  Pt in agreement with plan and
ready to dc tomorrow.  SW to continue to follow to arrange HH according to pt
preference and assist with finalizing safe dc plan.  Pt to dc home with
 tomorrow.

## 2019-06-20 NOTE — NUR
AM ASSESSMENT AND VITAL SIGNS COMPLETED AS DOCUMENTED. PT IS NOW MOD I IN HER
ROOM. PT WILL BE DISCHARGED FRIDAY.

## 2019-06-20 NOTE — NUR
RESTED WELL THROUGOUT HOURLY ROUNDS UP TO BATHROOM X 2 WITH WALKER GAIT
STAEADY WITH WALKER DEIES PAIN  WHEN UP . NO CHANGES IN PT ASSESSMENT WILL
CONINTUE WITH CURRENT PLAN OF CARE AND REPORT CHANGES OR ABNORMAL FINDINGS.

## 2019-06-21 VITALS — SYSTOLIC BLOOD PRESSURE: 109 MMHG | DIASTOLIC BLOOD PRESSURE: 59 MMHG

## 2019-06-21 VITALS — DIASTOLIC BLOOD PRESSURE: 59 MMHG | SYSTOLIC BLOOD PRESSURE: 109 MMHG

## 2019-06-21 LAB
ANION GAP SERPL CALC-SCNC: 10 MMOL/L (ref 7–16)
BUN SERPL-MCNC: 27 MG/DL (ref 7–18)
CALCIUM SERPL-MCNC: 9.2 MG/DL (ref 8.5–10.1)
CHLORIDE SERPL-SCNC: 100 MMOL/L (ref 98–107)
CO2 SERPL-SCNC: 28 MMOL/L (ref 21–32)
CREAT SERPL-MCNC: 1 MG/DL (ref 0.6–1.3)
GLUCOSE SERPL-MCNC: 106 MG/DL (ref 70–99)
HCT VFR BLD CALC: 26.3 % (ref 37–47)
HGB BLD-MCNC: 8.5 GM/DL (ref 12–15)
MAGNESIUM SERPL-MCNC: 1.6 MG/DL (ref 1.8–2.4)
MCH RBC QN AUTO: 28.4 PG (ref 26–34)
MCHC RBC AUTO-ENTMCNC: 32.5 G/DL (ref 28–37)
MCV RBC: 87.3 FL (ref 80–100)
MPV: 8.6 FL. (ref 7.2–11.1)
PLATELET COUNT*: 455 THOU/UL (ref 150–400)
POTASSIUM SERPL-SCNC: 4.3 MMOL/L (ref 3.5–5.1)
RBC # BLD AUTO: 3.01 MIL/UL (ref 4.2–5)
RDW-CV: 15.3 % (ref 10.5–14.5)
SODIUM SERPL-SCNC: 138 MMOL/L (ref 136–145)
WBC # BLD AUTO: 7.6 THOU/UL (ref 4–11)

## 2019-06-21 NOTE — NUR
GLEN met with pt and pt family to finalize safe dc plan for today for pt to dc
home with  and HH services to follow.  Pt/family preference for [facility]; GLEN faxed referral, orders, med list.   [phone] fax [phone].

## 2019-06-21 NOTE — NUR
UP X ONE DURING THE NIGHT TO THE BATHROOM.  NO COMPLAINTS VOICED.  GAVE
TYLENOL PER REQUEST AT 2220 FOR COMPLAINT OF GENERALIZED SORENESS WITH RELIEF.
HOURLY ROUNDING IN PROGRESS.  PATIENT TO GO HOME TO DAY.

## 2019-06-21 NOTE — NUR
PATIENT DISCHARGED HOME.  AND DTR PRESENT. REVIEW OF MEDICATIONS,
INSTRUCTIONS ON FOLLOW WITH PCP. WHEELED OUT IN WHEELCHAIR AND TRANSFERRED TO
FAMILY CARE WITHOUT INCIDENT.

## 2019-12-30 ENCOUNTER — HOSPITAL ENCOUNTER (OUTPATIENT)
Dept: HOSPITAL 96 - M.MRI | Age: 79
End: 2019-12-30
Attending: ORTHOPAEDIC SURGERY
Payer: MEDICARE

## 2019-12-30 DIAGNOSIS — M51.34: ICD-10-CM

## 2019-12-30 DIAGNOSIS — M51.16: ICD-10-CM

## 2019-12-30 DIAGNOSIS — M41.86: Primary | ICD-10-CM

## 2019-12-30 DIAGNOSIS — K57.30: ICD-10-CM

## 2019-12-30 DIAGNOSIS — M25.78: ICD-10-CM

## 2019-12-30 DIAGNOSIS — M48.061: ICD-10-CM

## 2019-12-30 DIAGNOSIS — M51.24: ICD-10-CM

## 2019-12-30 DIAGNOSIS — M51.35: ICD-10-CM

## 2021-07-27 ENCOUNTER — HOSPITAL ENCOUNTER (INPATIENT)
Dept: HOSPITAL 96 - M.ERS | Age: 81
LOS: 2 days | Discharge: HOME HEALTH | DRG: 177 | End: 2021-07-29
Attending: INTERNAL MEDICINE | Admitting: INTERNAL MEDICINE
Payer: MEDICARE

## 2021-07-27 VITALS — DIASTOLIC BLOOD PRESSURE: 54 MMHG | SYSTOLIC BLOOD PRESSURE: 151 MMHG

## 2021-07-27 VITALS — DIASTOLIC BLOOD PRESSURE: 49 MMHG | SYSTOLIC BLOOD PRESSURE: 147 MMHG

## 2021-07-27 VITALS — BODY MASS INDEX: 25.3 KG/M2 | HEIGHT: 60.98 IN | WEIGHT: 134 LBS

## 2021-07-27 VITALS — SYSTOLIC BLOOD PRESSURE: 169 MMHG | DIASTOLIC BLOOD PRESSURE: 60 MMHG

## 2021-07-27 VITALS — DIASTOLIC BLOOD PRESSURE: 95 MMHG | SYSTOLIC BLOOD PRESSURE: 137 MMHG

## 2021-07-27 DIAGNOSIS — F17.210: ICD-10-CM

## 2021-07-27 DIAGNOSIS — E11.649: ICD-10-CM

## 2021-07-27 DIAGNOSIS — Z79.899: ICD-10-CM

## 2021-07-27 DIAGNOSIS — I10: ICD-10-CM

## 2021-07-27 DIAGNOSIS — E87.1: ICD-10-CM

## 2021-07-27 DIAGNOSIS — Z88.1: ICD-10-CM

## 2021-07-27 DIAGNOSIS — Z79.84: ICD-10-CM

## 2021-07-27 DIAGNOSIS — D64.9: ICD-10-CM

## 2021-07-27 DIAGNOSIS — Z88.8: ICD-10-CM

## 2021-07-27 DIAGNOSIS — Z20.822: ICD-10-CM

## 2021-07-27 DIAGNOSIS — Z79.82: ICD-10-CM

## 2021-07-27 DIAGNOSIS — J12.89: ICD-10-CM

## 2021-07-27 DIAGNOSIS — E03.9: ICD-10-CM

## 2021-07-27 DIAGNOSIS — Z90.49: ICD-10-CM

## 2021-07-27 DIAGNOSIS — G93.41: ICD-10-CM

## 2021-07-27 DIAGNOSIS — E78.00: ICD-10-CM

## 2021-07-27 DIAGNOSIS — U07.1: Primary | ICD-10-CM

## 2021-07-27 LAB
ABSOLUTE BASOPHILS: 0 THOU/UL (ref 0–0.2)
ABSOLUTE EOSINOPHILS: 0 THOU/UL (ref 0–0.7)
ABSOLUTE MONOCYTES: 1.1 THOU/UL (ref 0–1.2)
ALBUMIN SERPL-MCNC: 3.1 G/DL (ref 3.4–5)
ALBUMIN SERPL-MCNC: 3.5 G/DL (ref 3.4–5)
ALP SERPL-CCNC: 83 U/L (ref 46–116)
ALP SERPL-CCNC: 91 U/L (ref 46–116)
ALT SERPL-CCNC: 22 U/L (ref 30–65)
ALT SERPL-CCNC: 25 U/L (ref 30–65)
ANION GAP SERPL CALC-SCNC: 5 MMOL/L (ref 7–16)
ANION GAP SERPL CALC-SCNC: 9 MMOL/L (ref 7–16)
AST SERPL-CCNC: 23 U/L (ref 15–37)
AST SERPL-CCNC: 26 U/L (ref 15–37)
BASOPHILS NFR BLD AUTO: 0.7 %
BILIRUB SERPL-MCNC: 0.2 MG/DL
BILIRUB SERPL-MCNC: 0.2 MG/DL
BILIRUB UR-MCNC: NEGATIVE MG/DL
BUN SERPL-MCNC: 16 MG/DL (ref 7–18)
BUN SERPL-MCNC: 23 MG/DL (ref 7–18)
CALCIUM SERPL-MCNC: 7.8 MG/DL (ref 8.5–10.1)
CALCIUM SERPL-MCNC: 8.4 MG/DL (ref 8.5–10.1)
CHLORIDE SERPL-SCNC: 92 MMOL/L (ref 98–107)
CHLORIDE SERPL-SCNC: 98 MMOL/L (ref 98–107)
CO2 SERPL-SCNC: 27 MMOL/L (ref 21–32)
CO2 SERPL-SCNC: 29 MMOL/L (ref 21–32)
COLOR UR: YELLOW
CREAT SERPL-MCNC: 0.8 MG/DL (ref 0.6–1.3)
CREAT SERPL-MCNC: 0.9 MG/DL (ref 0.6–1.3)
EOSINOPHIL NFR BLD: 0.1 %
GLUCOSE SERPL-MCNC: 113 MG/DL (ref 70–99)
GLUCOSE SERPL-MCNC: 191 MG/DL (ref 70–99)
GRANULOCYTES NFR BLD MANUAL: 73.2 %
HCT VFR BLD CALC: 31.5 % (ref 37–47)
HGB BLD-MCNC: 10.8 GM/DL (ref 12–15)
KETONES UR STRIP-MCNC: NEGATIVE MG/DL
LYMPHOCYTES # BLD: 0.8 THOU/UL (ref 0.8–5.3)
LYMPHOCYTES NFR BLD AUTO: 10.8 %
MAGNESIUM SERPL-MCNC: 1.6 MG/DL (ref 1.8–2.4)
MCH RBC QN AUTO: 30.2 PG (ref 26–34)
MCHC RBC AUTO-ENTMCNC: 34.3 G/DL (ref 28–37)
MCV RBC: 88.1 FL (ref 80–100)
MONOCYTES NFR BLD: 15.2 %
MPV: 8 FL. (ref 7.2–11.1)
NEUTROPHILS # BLD: 5.2 THOU/UL (ref 1.6–8.1)
NT-PRO BRAIN NAT PEPTIDE: 994 PG/ML (ref ?–300)
NUCLEATED RBCS: 0 /100WBC
PLATELET COUNT*: 232 THOU/UL (ref 150–400)
POTASSIUM SERPL-SCNC: 3.9 MMOL/L (ref 3.5–5.1)
POTASSIUM SERPL-SCNC: 4.1 MMOL/L (ref 3.5–5.1)
PROT SERPL-MCNC: 6.9 G/DL (ref 6.4–8.2)
PROT SERPL-MCNC: 7.7 G/DL (ref 6.4–8.2)
PROT UR QL STRIP: NEGATIVE
RBC # BLD AUTO: 3.57 MIL/UL (ref 4.2–5)
RBC # UR STRIP: NEGATIVE /UL
RDW-CV: 13.1 % (ref 10.5–14.5)
SODIUM SERPL-SCNC: 128 MMOL/L (ref 136–145)
SODIUM SERPL-SCNC: 132 MMOL/L (ref 136–145)
SP GR UR STRIP: 1.02 (ref 1–1.03)
URINE CLARITY: CLEAR
URINE GLUCOSE-RANDOM: NEGATIVE
URINE LEUKOCYTES-REFLEX: NEGATIVE
URINE NITRITE-REFLEX: NEGATIVE
UROBILINOGEN UR STRIP-ACNC: 0.2 E.U./DL (ref 0.2–1)
WBC # BLD AUTO: 7.2 THOU/UL (ref 4–11)

## 2021-07-27 NOTE — EKG
[place] [address]
Phone:  [phone]                     ELECTROCARDIOGRAM REPORT      
_______________________________________________________________________________
 
Name:         FRANDY DIAZ           Room:          [place] [address]   ADM IN 
..#:    [id number]     Account #:     [id number]  
Admission:    21    Attend Phys:   Diamond Horne, 
Discharge:                Date of Birth: [birth year]  
Date of Service: 21 0430  Report #:      [id number]
        [id number]
_______________________________________________________________________________
THIS REPORT FOR:  //name//                      
 
                         [facility] ED
                                       
Test Date:    2021               Test Time:    04:30:55
Pat Name:     FRANDY DIAZ               Department:   
Patient ID:   [id number]            Room:         [facility]
Gender:       F                        Technician:   FL
:          [id number]               Requested By: Eleni Link
Order Number: [id number] MD:   Carlton Dominguez
                                 Measurements
Intervals                              Axis          
Rate:         74                       P:            86
MD:           155                      QRS:          -39
QRSD:         151                      T:            102
QT:           433                                    
QTc:          481                                    
                           Interpretive Statements
A-V dual-paced rhythm
No further analysis attempted due to paced rhythm
Compared to ECG 2019 12:35:40
Sinus rhythm no longer present
First degree AV block no longer present
Left-axis deviation no longer present
Myocardial infarct finding no longer present
T-wave abnormality no longer present
Possible ischemia no longer present
Electronically Signed On 2021 15:40:03 CDT by Carlton Dominguez
[email or web address]
 
 
 
 
 
 
 
 
 
 
 
 
 
 
 
  <ELECTRONICALLY SIGNED>
                                           By: Carlton Dominguez MD, [facility]     
  21     1540
D: 210   _____________________________________
T: 21 0430   Carlton Dominguez MD, [facility]       /EPI

## 2021-07-27 NOTE — CON
[address]  [address]                    CONSULTATION                  
_______________________________________________________________________________
 
Name:       FRANDY DIAZ            Room:           [address] [place]  
M.R.#:  [id number]      Account #:      [id number]  
Admission:  07/27/21     Attend Phys:    Diamond Horne MD 
Discharge:               Date of Birth:  [birth year]  
         Report #: [id number]
                                                                     [id number]
_______________________________________________________________________________
THIS REPORT FOR:  
 
cc:  Roberta Martins MD, Sarah Beth MD Khosla,Bob FREDERICK MD                                              ~
 
 
DATE OF CONSULTATION: 07/27/2021
 
HISTORY OF PRESENT ILLNESS:  This is an 81-year-old female patient who was 
evaluated by me for right-sided weakness as well as speech difficulty.  This 
happened in relation to hypoglycemia.  After hypoglycemia was corrected, her 
symptoms have resolved.  She still has some weakness in the leg, but that is 
also improving.  She describes those as more of being wobbly.
 
REVIEW OF SYSTEMS:  Positive for hypoglycemia, which occurred because of 
hypoglycemic agent as I understand.  She never had this kind of symptoms before.
 Her PT, OT evaluation is pending.  She does have a pacemaker.  She will follow 
up with Dr. Peterson at [facility] [employer] [facility].  Her risk factor is smoking.  She does take 
baby aspirin daily, but she does not take any stronger blood thinner.  She 
underwent an echocardiogram which demonstrated aortic stenosis and 
regurgitation, etc., but no evidence of PFO.  That was a relevant 14-point 
review of systems.  She does not know if pacemaker is compatible with MRI or 
not.
 
PAST MEDICAL HISTORY:  Negative for any stroke-like symptoms.  She had 
hyponatremia and she felt the same way in the past when hyponatremia occurred.
 
FAMILY HISTORY:  Negative for early age stroke.
 
SOCIAL HISTORY:  She says she smokes.
 
PHYSICAL EXAMINATION:  Indicates she is alert and responsive.  Her speech, 
concentration, fund of knowledge and memory is at her baseline.  Cranial nerve 
examination 2-12 looks unremarkable.  Strength, sensation, reflexes and tone 
looks symmetrical.  Pulses appeared to be palpable.  There is no carotid bruit, 
but she has a murmur in the heart.  She has a pacemaker there.  I could not look
at the patient's fundus.  She is moderately built individual.  Blood pressure is
151/54, respirations 16, pulse is 72, temperature is 97.9.  He does not have any
thyroid mass, but there is some thyroid abnormality in this patient's CT 
angiogram.  No respiratory difficulty is noticed.  Her sodium is low and it is 
becoming better.
 
IMPRESSION:  Transient ischemic attack like symptoms, which is most likely 
because of hypoglycemia, hyponatremia may have contributed to that.  She is 
already on aspirin and lipid profile is pending and I ordered TSH and vitamin 
 
 
 
[address] [place] [address]                    CONSULTATION                  
_______________________________________________________________________________
 
Name:       FRANDY DIAZ            Room:           [address] [place]  
.R.#:  [id number]      Account #:      [id number]  
Admission:  07/27/21     Attend Phys:    Diamond Horne MD 
Discharge:               Date of Birth:  [birth year]  
         Report #: [id number]
                                                                     [id number]
_______________________________________________________________________________
 
 
B12 level.  I will suggest checking that.  She does not know if the pacemaker is
compatible with MRI or not and even if it is compatible it will takes some time 
to set that up if you want to do it.  I discussed the options with her.  I think
it will be okay to let her go home and get those things done as an outpatient if
she is able to do reasonably well with physical therapy tomorrow.  Please call 
if further followup is needed.
 
Thank you very much for this referral.
 
 
 
 
 
 
 
 
 
 
 
 
 
 
 
 
 
 
 
 
 
 
 
 
 
 
 
 
 
 
 
 
 
 
                       
                                        By:                                
                 
D: 07/27/21 1959_______________________________________
T: 07/27/21 2347Bob Rasmussen MD            /nt

## 2021-07-27 NOTE — 2DMMODE
[place] [address]
Phone:  [phone] 2 D/M-MODE ECHOCARDIOGRAM     
_______________________________________________________________________________
 
Name:         FRANDY DIAZ           Room:          [place] [address]   ADM [place] 
[facility]#:    [id number]     Account #:     [id number]  
Admission:    21    Attend Phys:   Diamond Horne, 
Discharge:                Date of Birth: [birth year]  
Date of Service: 21 1653  Report #:      [id number]
        [id number]
_______________________________________________________________________________
THIS REPORT FOR:
 
cc:  Roberta Martins MD, Sarah Beth MD Liston, Michael J. MD [facility]     
                                                                       ~
 
--------------- APPROVED REPORT --------------
 
 
Study performed:  2021 13:53:17
 
EXAM: Comprehensive 2D, Doppler, and color-flow 
Echocardiogram 
Patient Location: In-Patient   
Room #:  er     Status:  routine
 
      BSA:         1.67
HR: 64 bpm BP:          137/95 mmHg 
Rhythm: NSR     
 
Other Information 
Study Quality: Good
 
Indications
CVA/TIA 
 
Echo Enhancing Agent
Indication: Rule out Shunt
Agent(s) / Amount(s) Used: Agitated Saline 10 cc
 
2D Dimensions
IVSd:  10.83 (7-11mm) LVOT Diam:  19.97 (18-24mm) 
LVDd:  49.88 mm  
PWd:  9.69 (7-11mm) Ascending Ao:  29.73 (22-36mm)
LVDs:  28.45 (25-40mm) 
Aortic Root:  29.58 mm 
 
Volumes
Left Atrial Volume (Systole) 
    LA ESV Index:  36.10 mL/m2
 
Aortic Valve
AoV Peak Laron.:  2.92 m/s 
AO Peak Gr.:  34.06 mmHg LVOT Max P.19 mmHg
AO Mean Gr.:  21.23 mmHg LVOT Mean P.43 mmHg
 
 
[place] [address]
Phone:  [phone]                     2 D/M-MODE ECHOCARDIOGRAM     
_______________________________________________________________________________
 
Name:         FRANDY DAIZRICE           Room:          [address] [place] 
..#:    [id number]     Account #:     [id number]  
Admission:    21    Attend Phys:   Diamond Horne, 
Discharge:                Date of Birth: [birth year]  
Date of Service: 21 1653  Report #:      [id number]
        [id number]
_______________________________________________________________________________
    LVOT Max V:  1.02 m/s
AO V2 VTI:  66.47 cm  LVOT Mean V:  0.73 m/s
VICENTE (VTI):  1.09 cm2  LVOT V1 VTI:  23.07 cm
 
Mitral Valve
    E/A Ratio:  1.04
    MV Decel. Time:  163.68 ms
MV E Max Laron.:  1.13 m/s 
MV PHT:  47.47 ms  
MVA (PHT):  4.63 cm2  
 
TDI
E/Lateral E':  12.56 E/Medial E':  12.56
   Medial E' Laron.:  0.09 m/s
   Lateral E' Laron.:  0.09 m/s
 
Pulmonary Valve
PV Peak Laron.:  1.27 m/s PV Peak Gr.:  6.46 mmHg
 
Tricuspid Valve
    RAP Estimate:  5.00 mmHg
TR Peak Gr.:  34.27 mmHg RVSP:  39.00 mmHg
    PA Pressure:  39.00 mmHg
 
Left Ventricle
The left ventricle is normal size. There is left ventricular systolic 
dyssynergy consistent with underlying bundle branch block. There is 
normal left ventricular wall thickness. Left ventricular systolic 
function is normal. LVEF is 55-60%. Transmitral Doppler flow pattern 
suggests impaired LV relaxation.
 
Right Ventricle
The right ventricle is normal size. The right ventricular systolic 
function is normal. Pacemaker lead is present in the right ventricle. 
 
Atria
Left atrium is mildly dilated. The interatrial septum is intact with 
no evidence for an atrial septal defect. The right atrium size is 
normal.
 
Aortic Valve
Moderate aortic valve sclerosis. Mild aortic regurgitation. Moderate 
aortic stenosis.
 
Mitral Valve
There is mitral annular calcification. [place] [address]
Phone:  [phone]                     2 D/M-MODE ECHOCARDIOGRAM     
_______________________________________________________________________________
 
Name:         FRANDY DIAZ           Room:          [address] [place] 
.R.#:    [id number]     Account #:     [id number]  
Admission:    21    Attend Phys:   Diamond Horne, 
Discharge:                Date of Birth: [birth year]  
Date of Service: 21 1653  Report #:      [id number]
        [id number]
_______________________________________________________________________________
evidence of mitral valve stenosis.
 
Tricuspid Valve
The tricuspid valve is normal in structure. Mild tricuspid 
regurgitation. Mild pulmonary hypertension. The RVSP is 40-45 
mmHg.
 
Pulmonic Valve
The pulmonary valve is normal in structure. There is no pulmonic 
valvular regurgitation.
 
Great Vessels
The aortic root is normal in size. IVC is normal in size and 
collapses >50% with inspiration.
 
Pericardium
There is no pericardial effusion.
 
<Conclusion>
The left ventricle is normal size.
There is normal left ventricular wall thickness.
Left ventricular systolic function is normal.
LVEF is 55-60%.
Transmitral Doppler flow pattern suggests impaired LV 
relaxation.
There is left ventricular systolic dyssynergy consistent with 
underlying bundle branch block.
Pacemaker lead is present in the right ventricle.
The interatrial septum is intact with no evidence for an atrial 
septal defect.
Left atrium is mildly dilated.
Moderate aortic valve sclerosis.
Mild aortic regurgitation.
Moderate aortic stenosis.
There is mitral annular calcification.
Mild mitral regurgitation.
Mild tricuspid regurgitation.
Mild pulmonary hypertension.
The RVSP is 40-45 mmHg.
IVC is normal in size and collapses >50% with inspiration.
 
 
 
 
  <ELECTRONICALLY SIGNED>
                                           By: Steve Oneal MD, FACC   
  21
D: 21   _____________________________________
T: 21   Steve Oneal MD, FACC     /INF

## 2021-07-28 VITALS — DIASTOLIC BLOOD PRESSURE: 54 MMHG | SYSTOLIC BLOOD PRESSURE: 133 MMHG

## 2021-07-28 VITALS — DIASTOLIC BLOOD PRESSURE: 51 MMHG | SYSTOLIC BLOOD PRESSURE: 165 MMHG

## 2021-07-28 VITALS — SYSTOLIC BLOOD PRESSURE: 133 MMHG | DIASTOLIC BLOOD PRESSURE: 44 MMHG

## 2021-07-28 VITALS — SYSTOLIC BLOOD PRESSURE: 116 MMHG | DIASTOLIC BLOOD PRESSURE: 48 MMHG

## 2021-07-28 VITALS — DIASTOLIC BLOOD PRESSURE: 40 MMHG | SYSTOLIC BLOOD PRESSURE: 136 MMHG

## 2021-07-28 VITALS — SYSTOLIC BLOOD PRESSURE: 125 MMHG | DIASTOLIC BLOOD PRESSURE: 48 MMHG

## 2021-07-28 LAB
ABSOLUTE MONOCYTES: 0.4 THOU/UL (ref 0–1.2)
ANION GAP SERPL CALC-SCNC: 6 MMOL/L (ref 7–16)
ANISOCYTOSIS BLD QL SMEAR: (no result)
BUN SERPL-MCNC: 16 MG/DL (ref 7–18)
CALCIUM SERPL-MCNC: 7.8 MG/DL (ref 8.5–10.1)
CHLORIDE SERPL-SCNC: 101 MMOL/L (ref 98–107)
CHOLEST SERPL-MCNC: 159 MG/DL (ref ?–200)
CO2 SERPL-SCNC: 29 MMOL/L (ref 21–32)
CREAT SERPL-MCNC: 1 MG/DL (ref 0.6–1.3)
GLUCOSE SERPL-MCNC: 215 MG/DL (ref 70–99)
GRANULOCYTES NFR BLD MANUAL: 68 %
HCT VFR BLD CALC: 30.5 % (ref 37–47)
HDLC SERPL-MCNC: 39 MG/DL (ref 40–?)
HGB BLD-MCNC: 10.3 GM/DL (ref 12–15)
LDLC SERPL-MCNC: 98 MG/DL (ref ?–100)
LYMPHOCYTES # BLD: 1.1 THOU/UL (ref 0.8–5.3)
LYMPHOCYTES NFR BLD AUTO: 23 %
MCH RBC QN AUTO: 30 PG (ref 26–34)
MCHC RBC AUTO-ENTMCNC: 33.8 G/DL (ref 28–37)
MCV RBC: 88.9 FL (ref 80–100)
MONOCYTES NFR BLD: 9 %
MPV: 8.9 FL. (ref 7.2–11.1)
NEUTROPHILS # BLD: 3.2 THOU/UL (ref 1.6–8.1)
NUCLEATED RBCS: 0 /100WBC
PLATELET # BLD EST: ADEQUATE 10*3/UL
PLATELET COUNT*: 230 THOU/UL (ref 150–400)
POIKILOCYTOSIS BLD QL SMEAR: (no result)
POTASSIUM SERPL-SCNC: 4.6 MMOL/L (ref 3.5–5.1)
RBC # BLD AUTO: 3.43 MIL/UL (ref 4.2–5)
RDW-CV: 13.1 % (ref 10.5–14.5)
SERUM ASSESSMENT: CLEAR
SODIUM SERPL-SCNC: 136 MMOL/L (ref 136–145)
TC:HDL: 4.1 RATIO
TRIGL SERPL-MCNC: 114 MG/DL (ref ?–150)
VLDLC SERPL CALC-MCNC: 23 MG/DL (ref ?–40)
WBC # BLD AUTO: 4.7 THOU/UL (ref 4–11)

## 2021-07-29 VITALS — DIASTOLIC BLOOD PRESSURE: 41 MMHG | SYSTOLIC BLOOD PRESSURE: 125 MMHG

## 2021-07-29 VITALS — DIASTOLIC BLOOD PRESSURE: 59 MMHG | SYSTOLIC BLOOD PRESSURE: 139 MMHG

## 2021-07-29 VITALS — SYSTOLIC BLOOD PRESSURE: 116 MMHG | DIASTOLIC BLOOD PRESSURE: 49 MMHG

## 2021-07-29 VITALS — DIASTOLIC BLOOD PRESSURE: 45 MMHG | SYSTOLIC BLOOD PRESSURE: 132 MMHG

## 2021-07-29 VITALS — DIASTOLIC BLOOD PRESSURE: 52 MMHG | SYSTOLIC BLOOD PRESSURE: 147 MMHG

## 2021-07-29 LAB
EST. AVERAGE GLUCOSE BLD GHB EST-MCNC: 146 MG/DL
GLYCOHEMOGLOBIN (HGB A1C): 6.7 % (ref 4.8–5.6)

## 2021-08-04 ENCOUNTER — HOSPITAL ENCOUNTER (INPATIENT)
Dept: HOSPITAL 96 - M.ERS | Age: 81
LOS: 15 days | Discharge: NURSING, REHAB OR LONG-TERM CARE | DRG: 177 | End: 2021-08-19
Attending: INTERNAL MEDICINE | Admitting: INTERNAL MEDICINE
Payer: MEDICARE

## 2021-08-04 VITALS — WEIGHT: 137.2 LBS | BODY MASS INDEX: 26.93 KG/M2 | HEIGHT: 60 IN

## 2021-08-04 VITALS — SYSTOLIC BLOOD PRESSURE: 137 MMHG | DIASTOLIC BLOOD PRESSURE: 49 MMHG

## 2021-08-04 VITALS — DIASTOLIC BLOOD PRESSURE: 53 MMHG | SYSTOLIC BLOOD PRESSURE: 152 MMHG

## 2021-08-04 VITALS — DIASTOLIC BLOOD PRESSURE: 52 MMHG | SYSTOLIC BLOOD PRESSURE: 156 MMHG

## 2021-08-04 VITALS — SYSTOLIC BLOOD PRESSURE: 145 MMHG | DIASTOLIC BLOOD PRESSURE: 55 MMHG

## 2021-08-04 VITALS — DIASTOLIC BLOOD PRESSURE: 50 MMHG | SYSTOLIC BLOOD PRESSURE: 153 MMHG

## 2021-08-04 VITALS — SYSTOLIC BLOOD PRESSURE: 128 MMHG | DIASTOLIC BLOOD PRESSURE: 48 MMHG

## 2021-08-04 VITALS — SYSTOLIC BLOOD PRESSURE: 126 MMHG | DIASTOLIC BLOOD PRESSURE: 46 MMHG

## 2021-08-04 DIAGNOSIS — K21.9: ICD-10-CM

## 2021-08-04 DIAGNOSIS — U07.1: Primary | ICD-10-CM

## 2021-08-04 DIAGNOSIS — J12.82: ICD-10-CM

## 2021-08-04 DIAGNOSIS — F17.210: ICD-10-CM

## 2021-08-04 DIAGNOSIS — D64.9: ICD-10-CM

## 2021-08-04 DIAGNOSIS — E11.65: ICD-10-CM

## 2021-08-04 DIAGNOSIS — I35.0: ICD-10-CM

## 2021-08-04 DIAGNOSIS — E78.5: ICD-10-CM

## 2021-08-04 DIAGNOSIS — E03.9: ICD-10-CM

## 2021-08-04 DIAGNOSIS — J44.0: ICD-10-CM

## 2021-08-04 DIAGNOSIS — E78.00: ICD-10-CM

## 2021-08-04 DIAGNOSIS — Z79.899: ICD-10-CM

## 2021-08-04 DIAGNOSIS — I10: ICD-10-CM

## 2021-08-04 DIAGNOSIS — Z79.84: ICD-10-CM

## 2021-08-04 DIAGNOSIS — R79.89: ICD-10-CM

## 2021-08-04 DIAGNOSIS — J80: ICD-10-CM

## 2021-08-04 DIAGNOSIS — Z90.49: ICD-10-CM

## 2021-08-04 DIAGNOSIS — J44.1: ICD-10-CM

## 2021-08-04 DIAGNOSIS — Z88.1: ICD-10-CM

## 2021-08-04 DIAGNOSIS — Z79.82: ICD-10-CM

## 2021-08-04 LAB
ABSOLUTE BASOPHILS: 0.1 THOU/UL (ref 0–0.2)
ABSOLUTE EOSINOPHILS: 0 THOU/UL (ref 0–0.7)
ABSOLUTE MONOCYTES: 0.5 THOU/UL (ref 0–1.2)
ALBUMIN SERPL-MCNC: 3 G/DL (ref 3.4–5)
ALP SERPL-CCNC: 96 U/L (ref 46–116)
ALT SERPL-CCNC: 25 U/L (ref 30–65)
ANION GAP SERPL CALC-SCNC: 8 MMOL/L (ref 7–16)
AST SERPL-CCNC: 41 U/L (ref 15–37)
BASOPHILS NFR BLD AUTO: 0.6 %
BILIRUB SERPL-MCNC: 0.4 MG/DL
BUN SERPL-MCNC: 23 MG/DL (ref 7–18)
CALCIUM SERPL-MCNC: 8 MG/DL (ref 8.5–10.1)
CHLORIDE SERPL-SCNC: 97 MMOL/L (ref 98–107)
CO2 SERPL-SCNC: 26 MMOL/L (ref 21–32)
CREAT SERPL-MCNC: 1 MG/DL (ref 0.6–1.3)
EOSINOPHIL NFR BLD: 0.1 %
GLUCOSE SERPL-MCNC: 192 MG/DL (ref 70–99)
GRANULOCYTES NFR BLD MANUAL: 80.8 %
HCT VFR BLD CALC: 32.6 % (ref 37–47)
HGB BLD-MCNC: 10.7 GM/DL (ref 12–15)
LYMPHOCYTES # BLD: 0.9 THOU/UL (ref 0.8–5.3)
LYMPHOCYTES NFR BLD AUTO: 11.7 %
MCH RBC QN AUTO: 28.6 PG (ref 26–34)
MCHC RBC AUTO-ENTMCNC: 32.9 G/DL (ref 28–37)
MCV RBC: 86.9 FL (ref 80–100)
MONOCYTES NFR BLD: 6.8 %
MPV: 7.7 FL. (ref 7.2–11.1)
NEUTROPHILS # BLD: 6.4 THOU/UL (ref 1.6–8.1)
NT-PRO BRAIN NAT PEPTIDE: 913 PG/ML (ref ?–300)
NUCLEATED RBCS: 0 /100WBC
PLATELET COUNT*: 331 THOU/UL (ref 150–400)
POTASSIUM SERPL-SCNC: 4.5 MMOL/L (ref 3.5–5.1)
PROT SERPL-MCNC: 7.1 G/DL (ref 6.4–8.2)
RBC # BLD AUTO: 3.75 MIL/UL (ref 4.2–5)
RDW-CV: 13.2 % (ref 10.5–14.5)
SODIUM SERPL-SCNC: 131 MMOL/L (ref 136–145)
WBC # BLD AUTO: 7.9 THOU/UL (ref 4–11)

## 2021-08-04 PROCEDURE — XW13325 TRANSFUSION OF CONVALESCENT PLASMA (NONAUTOLOGOUS) INTO PERIPHERAL VEIN, PERCUTANEOUS APPROACH, NEW TECHNOLOGY GROUP 5: ICD-10-PCS | Performed by: INTERNAL MEDICINE

## 2021-08-04 PROCEDURE — XW033H5 INTRODUCTION OF TOCILIZUMAB INTO PERIPHERAL VEIN, PERCUTANEOUS APPROACH, NEW TECHNOLOGY GROUP 5: ICD-10-PCS

## 2021-08-04 PROCEDURE — 5A0945A ASSISTANCE WITH RESPIRATORY VENTILATION, 24-96 CONSECUTIVE HOURS, HIGH NASAL FLOW/VELOCITY: ICD-10-PCS | Performed by: INTERNAL MEDICINE

## 2021-08-04 NOTE — EKG
[place] [address]
Phone:  [phone]                     ELECTROCARDIOGRAM REPORT      
_______________________________________________________________________________
 
Name:         YOLI DIAZEUSEBIA ARNOLDE           Room:          [place] [address]   ADM [place] 
[facility].#:    [id number]     Account #:     [id number]  
Admission:    21    Attend Phys:   Diamond Horne, 
Discharge:                Date of Birth: [birth year]  
Date of Service: 21 0805  Report #:      [id number]
        [id number]
_______________________________________________________________________________
THIS REPORT FOR:  //name//                      
 
                         [facility] ED
                                       
Test Date:    2021               Test Time:    08:05:39
Pat Name:     FRANDY DIAZ               Department:   
Patient ID:   [id number]            Room:         [facility]
Gender:       F                        Technician:   RADHA
:          [id number]               Requested By: Juni Jerome
Order Number: [id number] MD:   Tip Penn
                                 Measurements
Intervals                              Axis          
Rate:         69                       P:            6
WI:           209                      QRS:          -57
QRSD:         149                      T:            86
QT:           442                                    
QTc:          474                                    
                           Interpretive Statements
Atrial-sensed ventricular-paced rhythm
No further analysis attempted due to paced rhythm
Compared to ECG 2021 04:30:55
atrial paced beats no longer seen
Electronically Signed On 2021 9:45:26 CDT by Tip Penn
[email or web address]
 
 
 
 
 
 
 
 
 
 
 
 
 
 
 
 
 
 
 
 
  <ELECTRONICALLY SIGNED>
                                           By: Tip Penn MD, [facility]      
  21     0945
D: 21   _____________________________________
T: 21 08   Tip Penn MD, [facility]        /EPI

## 2021-08-05 VITALS — SYSTOLIC BLOOD PRESSURE: 131 MMHG | DIASTOLIC BLOOD PRESSURE: 62 MMHG

## 2021-08-05 VITALS — DIASTOLIC BLOOD PRESSURE: 55 MMHG | SYSTOLIC BLOOD PRESSURE: 120 MMHG

## 2021-08-05 VITALS — SYSTOLIC BLOOD PRESSURE: 106 MMHG | DIASTOLIC BLOOD PRESSURE: 42 MMHG

## 2021-08-05 VITALS — DIASTOLIC BLOOD PRESSURE: 41 MMHG | SYSTOLIC BLOOD PRESSURE: 102 MMHG

## 2021-08-05 VITALS — SYSTOLIC BLOOD PRESSURE: 127 MMHG | DIASTOLIC BLOOD PRESSURE: 46 MMHG

## 2021-08-05 LAB
ALBUMIN SERPL-MCNC: 2.6 G/DL (ref 3.4–5)
ALP SERPL-CCNC: 90 U/L (ref 46–116)
ALT SERPL-CCNC: 26 U/L (ref 30–65)
ANION GAP SERPL CALC-SCNC: 7 MMOL/L (ref 7–16)
AST SERPL-CCNC: 49 U/L (ref 15–37)
BILIRUB SERPL-MCNC: 0.4 MG/DL
BUN SERPL-MCNC: 20 MG/DL (ref 7–18)
CALCIUM SERPL-MCNC: 8.1 MG/DL (ref 8.5–10.1)
CHLORIDE SERPL-SCNC: 100 MMOL/L (ref 98–107)
CO2 SERPL-SCNC: 27 MMOL/L (ref 21–32)
CREAT SERPL-MCNC: 0.9 MG/DL (ref 0.6–1.3)
GLUCOSE SERPL-MCNC: 145 MG/DL (ref 70–99)
HCT VFR BLD CALC: 31.3 % (ref 37–47)
HGB BLD-MCNC: 10.3 GM/DL (ref 12–15)
MAGNESIUM SERPL-MCNC: 1.4 MG/DL (ref 1.8–2.4)
MCH RBC QN AUTO: 28.7 PG (ref 26–34)
MCHC RBC AUTO-ENTMCNC: 33.1 G/DL (ref 28–37)
MCV RBC: 86.8 FL (ref 80–100)
MPV: 7.8 FL. (ref 7.2–11.1)
PLATELET COUNT*: 334 THOU/UL (ref 150–400)
POTASSIUM SERPL-SCNC: 4.6 MMOL/L (ref 3.5–5.1)
PROT SERPL-MCNC: 6.6 G/DL (ref 6.4–8.2)
RBC # BLD AUTO: 3.6 MIL/UL (ref 4.2–5)
RDW-CV: 13 % (ref 10.5–14.5)
SODIUM SERPL-SCNC: 134 MMOL/L (ref 136–145)
WBC # BLD AUTO: 7.2 THOU/UL (ref 4–11)

## 2021-08-05 NOTE — NUR
Pt recently dc from this hospital on 7/29 with [facility]. Pt was
covid positive during last hospital stay, Pt rapid covid is negative now.  Pt
normally resides at home with . Independent. Uses a cane for mobility.
Pt currently on 40L of ox, continue to wean. Pt will be in the hospital for
several days.

## 2021-08-05 NOTE — NUR
ASSUMED CARE OF PT AROUND 1730 FROM MONI WHITMORE PT. IN BED, DENIES PAIN OR
DISCOMFORT, ON HEATED HIGHFLOW, V PACED ON MONITOR. CALL LIGHT AND PERSONAL
BELONGINGS PLACED WITHIN REACH. PT. IN BED, ALERT WITH EYES OPEN AT THIS TIME.

## 2021-08-05 NOTE — NUR
ASSUMED CARE OF PT AT 1900. PT IS ALERT AND ORIENTED. VSS. PERRLA. PT'S O2
TURNED UP TO 15 LITERS NASAL CANNULA. PT GIVEN LASIX. PT IS IN SINUS RYTHM ON
THE TELEMETRY. PT IS RESTING COMFORTABLY IN BED. RESPIRATIONS ARE EVEN AND
NONLABORED. WILL CONTINUE TO MONITOR PT. 0 = independent

## 2021-08-06 VITALS — SYSTOLIC BLOOD PRESSURE: 121 MMHG | DIASTOLIC BLOOD PRESSURE: 58 MMHG

## 2021-08-06 VITALS — DIASTOLIC BLOOD PRESSURE: 52 MMHG | SYSTOLIC BLOOD PRESSURE: 119 MMHG

## 2021-08-06 VITALS — DIASTOLIC BLOOD PRESSURE: 50 MMHG | SYSTOLIC BLOOD PRESSURE: 118 MMHG

## 2021-08-06 VITALS — DIASTOLIC BLOOD PRESSURE: 71 MMHG | SYSTOLIC BLOOD PRESSURE: 117 MMHG

## 2021-08-06 VITALS — DIASTOLIC BLOOD PRESSURE: 58 MMHG | SYSTOLIC BLOOD PRESSURE: 105 MMHG

## 2021-08-06 VITALS — DIASTOLIC BLOOD PRESSURE: 45 MMHG | SYSTOLIC BLOOD PRESSURE: 111 MMHG

## 2021-08-06 LAB
ABSOLUTE MONOCYTES: 0.2 THOU/UL (ref 0–1.2)
ANION GAP SERPL CALC-SCNC: 10 MMOL/L (ref 7–16)
BUN SERPL-MCNC: 33 MG/DL (ref 7–18)
CALCIUM SERPL-MCNC: 7.9 MG/DL (ref 8.5–10.1)
CHLORIDE SERPL-SCNC: 100 MMOL/L (ref 98–107)
CO2 SERPL-SCNC: 26 MMOL/L (ref 21–32)
CREAT SERPL-MCNC: 1.1 MG/DL (ref 0.6–1.3)
GLUCOSE SERPL-MCNC: 155 MG/DL (ref 70–99)
GRANULOCYTES NFR BLD MANUAL: 86 %
HCT VFR BLD CALC: 32.4 % (ref 37–47)
HGB BLD-MCNC: 10.5 GM/DL (ref 12–15)
LYMPHOCYTES # BLD: 1 THOU/UL (ref 0.8–5.3)
LYMPHOCYTES NFR BLD AUTO: 6 %
MCH RBC QN AUTO: 28.1 PG (ref 26–34)
MCHC RBC AUTO-ENTMCNC: 32.3 G/DL (ref 28–37)
MCV RBC: 86.9 FL (ref 80–100)
MONOCYTES NFR BLD: 1 %
MPV: 7.8 FL. (ref 7.2–11.1)
NEUTROPHILS # BLD: 15.4 THOU/UL (ref 1.6–8.1)
NEUTS BAND NFR BLD: 7 %
NUCLEATED RBCS: 0 /100WBC
PLATELET # BLD EST: ADEQUATE 10*3/UL
PLATELET COUNT*: 390 THOU/UL (ref 150–400)
POTASSIUM SERPL-SCNC: 5 MMOL/L (ref 3.5–5.1)
RBC # BLD AUTO: 3.73 MIL/UL (ref 4.2–5)
RDW-CV: 13.2 % (ref 10.5–14.5)
SODIUM SERPL-SCNC: 136 MMOL/L (ref 136–145)
WBC # BLD AUTO: 16.6 THOU/UL (ref 4–11)

## 2021-08-06 PROCEDURE — XW033E5 INTRODUCTION OF REMDESIVIR ANTI-INFECTIVE INTO PERIPHERAL VEIN, PERCUTANEOUS APPROACH, NEW TECHNOLOGY GROUP 5: ICD-10-PCS | Performed by: INTERNAL MEDICINE

## 2021-08-06 NOTE — NUR
ASSUMED PT CARE AT APPROX. 1915. PT IS A/OX4. PT IS TRACING AV-PACED ON
CARDIAC MONITOR. PT HAS A DRY NPC. PT IS ON HHFNC AT 55L/100%. O2 SATS
DECREASE W/ ACTIVITY. PT CURRENT RESTING O2 RATE IS 93%. PT RR IS 16/MIN AND
UNLABORED. PT DENIES C/O PAIN. PT CURRENTLY RESTING IN BED. HOB ELEVATED TO
SEMI-FOWLERS POSITION. FALL PRECAUTIONS IN PLACE FOR SAFETY. CALL LIGHT WITHIN
REACH. ASSESSMENTS AND HOURLY ROUNDS COMPLETE AS CHARTED. WILL CONT. TO
MONITOR.

## 2021-08-07 VITALS — SYSTOLIC BLOOD PRESSURE: 128 MMHG | DIASTOLIC BLOOD PRESSURE: 55 MMHG

## 2021-08-07 VITALS — DIASTOLIC BLOOD PRESSURE: 40 MMHG | SYSTOLIC BLOOD PRESSURE: 107 MMHG

## 2021-08-07 VITALS — SYSTOLIC BLOOD PRESSURE: 109 MMHG | DIASTOLIC BLOOD PRESSURE: 44 MMHG

## 2021-08-07 VITALS — SYSTOLIC BLOOD PRESSURE: 91 MMHG | DIASTOLIC BLOOD PRESSURE: 41 MMHG

## 2021-08-07 VITALS — DIASTOLIC BLOOD PRESSURE: 42 MMHG | SYSTOLIC BLOOD PRESSURE: 99 MMHG

## 2021-08-07 VITALS — SYSTOLIC BLOOD PRESSURE: 124 MMHG | DIASTOLIC BLOOD PRESSURE: 51 MMHG

## 2021-08-07 LAB
ABSOLUTE BASOPHILS: 0 THOU/UL (ref 0–0.2)
ABSOLUTE EOSINOPHILS: 0 THOU/UL (ref 0–0.7)
ABSOLUTE MONOCYTES: 0.6 THOU/UL (ref 0–1.2)
ALBUMIN SERPL-MCNC: 2.7 G/DL (ref 3.4–5)
ALP SERPL-CCNC: 117 U/L (ref 46–116)
ALT SERPL-CCNC: 27 U/L (ref 30–65)
ANION GAP SERPL CALC-SCNC: 8 MMOL/L (ref 7–16)
AST SERPL-CCNC: 57 U/L (ref 15–37)
BASOPHILS NFR BLD AUTO: 0.1 %
BILIRUB SERPL-MCNC: 0.3 MG/DL
BUN SERPL-MCNC: 44 MG/DL (ref 7–18)
CALCIUM SERPL-MCNC: 8.1 MG/DL (ref 8.5–10.1)
CHLORIDE SERPL-SCNC: 100 MMOL/L (ref 98–107)
CO2 SERPL-SCNC: 27 MMOL/L (ref 21–32)
CREAT SERPL-MCNC: 1.2 MG/DL (ref 0.6–1.3)
EOSINOPHIL NFR BLD: 0 %
GLUCOSE SERPL-MCNC: 88 MG/DL (ref 70–99)
GRANULOCYTES NFR BLD MANUAL: 93.2 %
HCT VFR BLD CALC: 33.9 % (ref 37–47)
HGB BLD-MCNC: 10.7 GM/DL (ref 12–15)
LYMPHOCYTES # BLD: 0.8 THOU/UL (ref 0.8–5.3)
LYMPHOCYTES NFR BLD AUTO: 3.8 %
MCH RBC QN AUTO: 27.4 PG (ref 26–34)
MCHC RBC AUTO-ENTMCNC: 31.6 G/DL (ref 28–37)
MCV RBC: 86.7 FL (ref 80–100)
MONOCYTES NFR BLD: 2.9 %
MPV: 7.9 FL. (ref 7.2–11.1)
NEUTROPHILS # BLD: 18.6 THOU/UL (ref 1.6–8.1)
NUCLEATED RBCS: 0 /100WBC
PHOSPHATE SERPL-MCNC: 3.7 MG/DL (ref 2.5–4.9)
PLATELET COUNT*: 381 THOU/UL (ref 150–400)
POTASSIUM SERPL-SCNC: 4.3 MMOL/L (ref 3.5–5.1)
PROT SERPL-MCNC: 6.6 G/DL (ref 6.4–8.2)
RBC # BLD AUTO: 3.91 MIL/UL (ref 4.2–5)
RDW-CV: 12.9 % (ref 10.5–14.5)
SODIUM SERPL-SCNC: 135 MMOL/L (ref 136–145)
WBC # BLD AUTO: 19.9 THOU/UL (ref 4–11)

## 2021-08-07 PROCEDURE — 5A09357 ASSISTANCE WITH RESPIRATORY VENTILATION, LESS THAN 24 CONSECUTIVE HOURS, CONTINUOUS POSITIVE AIRWAY PRESSURE: ICD-10-PCS | Performed by: INTERNAL MEDICINE

## 2021-08-07 PROCEDURE — 5A0935A ASSISTANCE WITH RESPIRATORY VENTILATION, LESS THAN 24 CONSECUTIVE HOURS, HIGH NASAL FLOW/VELOCITY: ICD-10-PCS

## 2021-08-07 NOTE — NUR
PT A/OX4, PLEASANT AND COOPERATIVE, SHE HAS BEEN AV-V-PACED ON TELEMETRY,
CURRENTLY ON HEATED HIGH FLOW OXYGEN 55L AT 95% SHE HAS BEEN UP FOR MEALS
SITTING HER RECLINER THIS SHIFT. PATIENT HAS ADEQUATE URINE OUTPUT AND IS UP
WITH SBA TO BSC.  ORDERED CEPACHOL LOZENGES BECAUSE SHE HAS A SORE THROAT.

## 2021-08-07 NOTE — NUR
ASSUMED PT CARE AT APPROX. 1915. PT IS A/OX4. PT IS TRACING AV-PACED/V-PACED
CARDIAC MONITOR. PT IS ON BIPAP AND HHFNC DURING THE DAY. SEE CHART FOR RATES.
PT CURRENT RESTING O2 RATE IS 93%. PT RR IS 16/MIN AND IS UNLABORED.
PT DENIES C/O PAIN. PT CURRENTLY RESTING IN BED. HOB ELEVATED TO
SEMI-FOWLERS POSITION. PT PREFERS TO LAY ON LEFT SIDE. FALL PRECAUTIONS IN
PLACE FOR SAFETY. CALL LIGHT WITHIN REACH. ASSESSMENTS AND HOURLY ROUNDS
COMPLETE AS CHARTED. WILL CONT. TO MONITOR.

## 2021-08-08 VITALS — DIASTOLIC BLOOD PRESSURE: 40 MMHG | SYSTOLIC BLOOD PRESSURE: 107 MMHG

## 2021-08-08 VITALS — DIASTOLIC BLOOD PRESSURE: 60 MMHG | SYSTOLIC BLOOD PRESSURE: 105 MMHG

## 2021-08-08 VITALS — SYSTOLIC BLOOD PRESSURE: 118 MMHG | DIASTOLIC BLOOD PRESSURE: 45 MMHG

## 2021-08-08 VITALS — SYSTOLIC BLOOD PRESSURE: 103 MMHG | DIASTOLIC BLOOD PRESSURE: 47 MMHG

## 2021-08-08 VITALS — SYSTOLIC BLOOD PRESSURE: 105 MMHG | DIASTOLIC BLOOD PRESSURE: 75 MMHG

## 2021-08-08 VITALS — SYSTOLIC BLOOD PRESSURE: 91 MMHG | DIASTOLIC BLOOD PRESSURE: 41 MMHG

## 2021-08-08 LAB
ABSOLUTE BASOPHILS: 0 THOU/UL (ref 0–0.2)
ABSOLUTE EOSINOPHILS: 0 THOU/UL (ref 0–0.7)
ABSOLUTE MONOCYTES: 0.7 THOU/UL (ref 0–1.2)
ALBUMIN SERPL-MCNC: 2.5 G/DL (ref 3.4–5)
ALP SERPL-CCNC: 126 U/L (ref 46–116)
ALT SERPL-CCNC: 26 U/L (ref 30–65)
ANION GAP SERPL CALC-SCNC: 7 MMOL/L (ref 7–16)
AST SERPL-CCNC: 45 U/L (ref 15–37)
BASOPHILS NFR BLD AUTO: 0.1 %
BILIRUB SERPL-MCNC: 0.3 MG/DL
BUN SERPL-MCNC: 58 MG/DL (ref 7–18)
CALCIUM SERPL-MCNC: 7.9 MG/DL (ref 8.5–10.1)
CHLORIDE SERPL-SCNC: 101 MMOL/L (ref 98–107)
CO2 SERPL-SCNC: 27 MMOL/L (ref 21–32)
CREAT SERPL-MCNC: 1.4 MG/DL (ref 0.6–1.3)
EOSINOPHIL NFR BLD: 0 %
GLUCOSE SERPL-MCNC: 105 MG/DL (ref 70–99)
GRANULOCYTES NFR BLD MANUAL: 94.3 %
HCT VFR BLD CALC: 32 % (ref 37–47)
HGB BLD-MCNC: 10.6 GM/DL (ref 12–15)
LYMPHOCYTES # BLD: 0.5 THOU/UL (ref 0.8–5.3)
LYMPHOCYTES NFR BLD AUTO: 2.4 %
MAGNESIUM SERPL-MCNC: 2.2 MG/DL (ref 1.8–2.4)
MCH RBC QN AUTO: 28.6 PG (ref 26–34)
MCHC RBC AUTO-ENTMCNC: 33.3 G/DL (ref 28–37)
MCV RBC: 85.9 FL (ref 80–100)
MONOCYTES NFR BLD: 3.2 %
MPV: 7.9 FL. (ref 7.2–11.1)
NEUTROPHILS # BLD: 21.1 THOU/UL (ref 1.6–8.1)
NUCLEATED RBCS: 0 /100WBC
PLATELET COUNT*: 369 THOU/UL (ref 150–400)
POTASSIUM SERPL-SCNC: 4.4 MMOL/L (ref 3.5–5.1)
PROT SERPL-MCNC: 6.1 G/DL (ref 6.4–8.2)
RBC # BLD AUTO: 3.72 MIL/UL (ref 4.2–5)
RDW-CV: 13 % (ref 10.5–14.5)
SODIUM SERPL-SCNC: 135 MMOL/L (ref 136–145)
WBC # BLD AUTO: 22.4 THOU/UL (ref 4–11)

## 2021-08-08 PROCEDURE — 5A09357 ASSISTANCE WITH RESPIRATORY VENTILATION, LESS THAN 24 CONSECUTIVE HOURS, CONTINUOUS POSITIVE AIRWAY PRESSURE: ICD-10-PCS | Performed by: INTERNAL MEDICINE

## 2021-08-08 PROCEDURE — 5A0935A ASSISTANCE WITH RESPIRATORY VENTILATION, LESS THAN 24 CONSECUTIVE HOURS, HIGH NASAL FLOW/VELOCITY: ICD-10-PCS

## 2021-08-08 NOTE — NUR
ASSUMED CARE OF PT AFTER REPORT AT 1930. PT A&OX4. VSS. PHYSICAL ASSESSMENT
COMPLETED AND CHARTED. PT ON HHFNC 45L/100%/BIPAP 80% AT HS. PT TRACING AV
PACED ON TELE. PT DENIES ANY PAIN. REMINDED TO SLEEP ON SIDES. MAINTAINED ON
ENHANCED PREACUTION. CALL LIGHT WITHIN REACH.

## 2021-08-08 NOTE — NUR
PT A/OX4 PLEASANT AND COOPERATIVE, A-AV-V PACED ON TELE, WEARING HEATED
HI-FLOW 35L 100%. PT HAD A BM THIS MORNING ADEQUATE URINE OUTPUT, UP TO BSC
WITH SBA. PT MORE TALKATIVE AND APPEARS TO BE IN A BETTER MOOD TODAY.

## 2021-08-09 VITALS — DIASTOLIC BLOOD PRESSURE: 46 MMHG | SYSTOLIC BLOOD PRESSURE: 97 MMHG

## 2021-08-09 VITALS — SYSTOLIC BLOOD PRESSURE: 113 MMHG | DIASTOLIC BLOOD PRESSURE: 41 MMHG

## 2021-08-09 VITALS — DIASTOLIC BLOOD PRESSURE: 51 MMHG | SYSTOLIC BLOOD PRESSURE: 119 MMHG

## 2021-08-09 VITALS — SYSTOLIC BLOOD PRESSURE: 115 MMHG | DIASTOLIC BLOOD PRESSURE: 47 MMHG

## 2021-08-09 VITALS — SYSTOLIC BLOOD PRESSURE: 124 MMHG | DIASTOLIC BLOOD PRESSURE: 52 MMHG

## 2021-08-09 VITALS — DIASTOLIC BLOOD PRESSURE: 59 MMHG | SYSTOLIC BLOOD PRESSURE: 112 MMHG

## 2021-08-09 LAB
ABSOLUTE MONOCYTES: 0.2 THOU/UL (ref 0–1.2)
ALBUMIN SERPL-MCNC: 2.5 G/DL (ref 3.4–5)
ALP SERPL-CCNC: 127 U/L (ref 46–116)
ALT SERPL-CCNC: 30 U/L (ref 30–65)
ANION GAP SERPL CALC-SCNC: 7 MMOL/L (ref 7–16)
AST SERPL-CCNC: 48 U/L (ref 15–37)
BILIRUB SERPL-MCNC: 0.3 MG/DL
BUN SERPL-MCNC: 58 MG/DL (ref 7–18)
CALCIUM SERPL-MCNC: 8.2 MG/DL (ref 8.5–10.1)
CHLORIDE SERPL-SCNC: 102 MMOL/L (ref 98–107)
CO2 SERPL-SCNC: 27 MMOL/L (ref 21–32)
CREAT SERPL-MCNC: 1.5 MG/DL (ref 0.6–1.3)
GLUCOSE SERPL-MCNC: 96 MG/DL (ref 70–99)
GRANULOCYTES NFR BLD MANUAL: 95 %
HCT VFR BLD CALC: 31.1 % (ref 37–47)
HGB BLD-MCNC: 10.3 GM/DL (ref 12–15)
LYMPHOCYTES # BLD: 0.7 THOU/UL (ref 0.8–5.3)
LYMPHOCYTES NFR BLD AUTO: 3 %
MAGNESIUM SERPL-MCNC: 2.2 MG/DL (ref 1.8–2.4)
MCH RBC QN AUTO: 28.5 PG (ref 26–34)
MCHC RBC AUTO-ENTMCNC: 33 G/DL (ref 28–37)
MCV RBC: 86.2 FL (ref 80–100)
METAMYELOCYTES NFR BLD: 1 %
MONOCYTES NFR BLD: 1 %
MPV: 7.6 FL. (ref 7.2–11.1)
NEUTROPHILS # BLD: 21.3 THOU/UL (ref 1.6–8.1)
NUCLEATED RBCS: 0 /100WBC
PLATELET # BLD EST: ADEQUATE 10*3/UL
PLATELET COUNT*: 371 THOU/UL (ref 150–400)
POTASSIUM SERPL-SCNC: 4.8 MMOL/L (ref 3.5–5.1)
PROT SERPL-MCNC: 6 G/DL (ref 6.4–8.2)
RBC # BLD AUTO: 3.6 MIL/UL (ref 4.2–5)
RBC MORPH BLD: NORMAL
RDW-CV: 13.1 % (ref 10.5–14.5)
SODIUM SERPL-SCNC: 136 MMOL/L (ref 136–145)
WBC # BLD AUTO: 22.2 THOU/UL (ref 4–11)

## 2021-08-09 PROCEDURE — 5A09357 ASSISTANCE WITH RESPIRATORY VENTILATION, LESS THAN 24 CONSECUTIVE HOURS, CONTINUOUS POSITIVE AIRWAY PRESSURE: ICD-10-PCS | Performed by: INTERNAL MEDICINE

## 2021-08-09 PROCEDURE — 5A0935A ASSISTANCE WITH RESPIRATORY VENTILATION, LESS THAN 24 CONSECUTIVE HOURS, HIGH NASAL FLOW/VELOCITY: ICD-10-PCS

## 2021-08-09 NOTE — NUR
Pt c/o congestion and discomfort to sinuses and head.  Dr. Brooks ordered
Afrin and saline nasal sprays which were administered this evening.  Oxygen
titrated down from 30L @ 100% to 30L @ 60%.  O2 sats mid to upper 90s.  Pt has
been up in chair since shortly before breakfast.  VSS though BP on the lower
end of normal range.  Will continue to monitor.

## 2021-08-09 NOTE — NUR
ASSUMED CARE OF PT AFTER REPORT AT 1930.PT A&OX4. VSS. PHYSICAL ASSESSMENT
COMPLETED AND CHARTED. PT ON HHFNC 30L/100%/BIPAP 70%. PT TRACING AV PACED ON
TELE. PT UPSTANDBY. PT DENIES ANY PAIN. CALL LIGHT WITHIN REACH.

## 2021-08-10 VITALS — SYSTOLIC BLOOD PRESSURE: 121 MMHG | DIASTOLIC BLOOD PRESSURE: 53 MMHG

## 2021-08-10 VITALS — DIASTOLIC BLOOD PRESSURE: 49 MMHG | SYSTOLIC BLOOD PRESSURE: 117 MMHG

## 2021-08-10 VITALS — DIASTOLIC BLOOD PRESSURE: 46 MMHG | SYSTOLIC BLOOD PRESSURE: 113 MMHG

## 2021-08-10 VITALS — DIASTOLIC BLOOD PRESSURE: 38 MMHG | SYSTOLIC BLOOD PRESSURE: 119 MMHG

## 2021-08-10 VITALS — DIASTOLIC BLOOD PRESSURE: 39 MMHG | SYSTOLIC BLOOD PRESSURE: 120 MMHG

## 2021-08-10 VITALS — SYSTOLIC BLOOD PRESSURE: 113 MMHG | DIASTOLIC BLOOD PRESSURE: 42 MMHG

## 2021-08-10 LAB
ANION GAP SERPL CALC-SCNC: 6 MMOL/L (ref 7–16)
BUN SERPL-MCNC: 51 MG/DL (ref 7–18)
CALCIUM SERPL-MCNC: 8.4 MG/DL (ref 8.5–10.1)
CHLORIDE SERPL-SCNC: 101 MMOL/L (ref 98–107)
CO2 SERPL-SCNC: 27 MMOL/L (ref 21–32)
CREAT SERPL-MCNC: 1.2 MG/DL (ref 0.6–1.3)
GLUCOSE SERPL-MCNC: 117 MG/DL (ref 70–99)
HCT VFR BLD CALC: 31 % (ref 37–47)
HGB BLD-MCNC: 10.3 GM/DL (ref 12–15)
MCH RBC QN AUTO: 29.3 PG (ref 26–34)
MCHC RBC AUTO-ENTMCNC: 33.1 G/DL (ref 28–37)
MCV RBC: 88.5 FL (ref 80–100)
MPV: 8.5 FL. (ref 7.2–11.1)
PLATELET COUNT*: 179 THOU/UL (ref 150–400)
POTASSIUM SERPL-SCNC: 5.2 MMOL/L (ref 3.5–5.1)
RBC # BLD AUTO: 3.51 MIL/UL (ref 4.2–5)
RDW-CV: 13.4 % (ref 10.5–14.5)
SODIUM SERPL-SCNC: 134 MMOL/L (ref 136–145)
WBC # BLD AUTO: 18.8 THOU/UL (ref 4–11)

## 2021-08-10 PROCEDURE — 5A0935A ASSISTANCE WITH RESPIRATORY VENTILATION, LESS THAN 24 CONSECUTIVE HOURS, HIGH NASAL FLOW/VELOCITY: ICD-10-PCS

## 2021-08-10 PROCEDURE — 5A09357 ASSISTANCE WITH RESPIRATORY VENTILATION, LESS THAN 24 CONSECUTIVE HOURS, CONTINUOUS POSITIVE AIRWAY PRESSURE: ICD-10-PCS | Performed by: INTERNAL MEDICINE

## 2021-08-10 NOTE — NUR
ASSUMED CARE OF PT AFTER REPORT AT 1930. PT A&OX4. VSS. PHYSICAL ASSESSMENT
COMPLETED AND CHARTED. PT ON HHFNC 30L/55%/BIPAP 35%. PT TRACING AV PACED ON
TELE. PT UPSTANDBY TO BSC. CALL LIGHT WITHIN REACH.

## 2021-08-10 NOTE — NUR
The patient is sitting in the recliner. No c/o SOB or CP. Denies pain. C/o of
being lightheded and no enegry.

## 2021-08-11 VITALS — DIASTOLIC BLOOD PRESSURE: 76 MMHG | SYSTOLIC BLOOD PRESSURE: 129 MMHG

## 2021-08-11 VITALS — DIASTOLIC BLOOD PRESSURE: 44 MMHG | SYSTOLIC BLOOD PRESSURE: 115 MMHG

## 2021-08-11 VITALS — DIASTOLIC BLOOD PRESSURE: 48 MMHG | SYSTOLIC BLOOD PRESSURE: 126 MMHG

## 2021-08-11 VITALS — DIASTOLIC BLOOD PRESSURE: 52 MMHG | SYSTOLIC BLOOD PRESSURE: 143 MMHG

## 2021-08-11 VITALS — SYSTOLIC BLOOD PRESSURE: 141 MMHG | DIASTOLIC BLOOD PRESSURE: 42 MMHG

## 2021-08-11 VITALS — DIASTOLIC BLOOD PRESSURE: 47 MMHG | SYSTOLIC BLOOD PRESSURE: 131 MMHG

## 2021-08-11 LAB
ABSOLUTE BASOPHILS: 0 THOU/UL (ref 0–0.2)
ABSOLUTE EOSINOPHILS: 0 THOU/UL (ref 0–0.7)
ABSOLUTE MONOCYTES: 0.6 THOU/UL (ref 0–1.2)
ALBUMIN SERPL-MCNC: 3.1 G/DL (ref 3.4–5)
ALP SERPL-CCNC: 124 U/L (ref 46–116)
ALT SERPL-CCNC: 30 U/L (ref 30–65)
ANION GAP SERPL CALC-SCNC: 5 MMOL/L (ref 7–16)
AST SERPL-CCNC: 43 U/L (ref 15–37)
BASOPHILS NFR BLD AUTO: 0.1 %
BILIRUB SERPL-MCNC: 0.6 MG/DL
BUN SERPL-MCNC: 50 MG/DL (ref 7–18)
CALCIUM SERPL-MCNC: 8.4 MG/DL (ref 8.5–10.1)
CHLORIDE SERPL-SCNC: 98 MMOL/L (ref 98–107)
CO2 SERPL-SCNC: 31 MMOL/L (ref 21–32)
CREAT SERPL-MCNC: 1.2 MG/DL (ref 0.6–1.3)
EOSINOPHIL NFR BLD: 0 %
GLUCOSE SERPL-MCNC: 118 MG/DL (ref 70–99)
GRANULOCYTES NFR BLD MANUAL: 92.8 %
HCT VFR BLD CALC: 32.7 % (ref 37–47)
HGB BLD-MCNC: 10.7 GM/DL (ref 12–15)
LYMPHOCYTES # BLD: 0.4 THOU/UL (ref 0.8–5.3)
LYMPHOCYTES NFR BLD AUTO: 2.9 %
MAGNESIUM SERPL-MCNC: 2 MG/DL (ref 1.8–2.4)
MCH RBC QN AUTO: 28.5 PG (ref 26–34)
MCHC RBC AUTO-ENTMCNC: 32.8 G/DL (ref 28–37)
MCV RBC: 86.8 FL (ref 80–100)
MONOCYTES NFR BLD: 4.2 %
MPV: 8.4 FL. (ref 7.2–11.1)
NEUTROPHILS # BLD: 13.6 THOU/UL (ref 1.6–8.1)
NUCLEATED RBCS: 0 /100WBC
PLATELET COUNT*: 356 THOU/UL (ref 150–400)
POTASSIUM SERPL-SCNC: 5.1 MMOL/L (ref 3.5–5.1)
PROT SERPL-MCNC: 6.2 G/DL (ref 6.4–8.2)
RBC # BLD AUTO: 3.77 MIL/UL (ref 4.2–5)
RDW-CV: 13.3 % (ref 10.5–14.5)
SODIUM SERPL-SCNC: 134 MMOL/L (ref 136–145)
WBC # BLD AUTO: 14.6 THOU/UL (ref 4–11)

## 2021-08-11 PROCEDURE — 5A09357 ASSISTANCE WITH RESPIRATORY VENTILATION, LESS THAN 24 CONSECUTIVE HOURS, CONTINUOUS POSITIVE AIRWAY PRESSURE: ICD-10-PCS | Performed by: INTERNAL MEDICINE

## 2021-08-11 PROCEDURE — 5A0935A ASSISTANCE WITH RESPIRATORY VENTILATION, LESS THAN 24 CONSECUTIVE HOURS, HIGH NASAL FLOW/VELOCITY: ICD-10-PCS

## 2021-08-11 NOTE — NUR
Anticipate dc tomorrow. Covid positive. Pt down to 5L, continue to wean. Will
need an ex ox at dc. Therapies to see today for dc to home safety.

## 2021-08-11 NOTE — NUR
ASSUMED CARE OF PT AFTER REPORT AT 1930. PT A&OX4. VSS. PHYSICAL ASSESSMENT
COMPLETED AND CHARTED. PT ON HFNC 10L/BIPAP 40%. PT TRACING AV PACED. PT
UPSTANDBY. PT COMPLAINED OF LIGHT HEADEDNESS. FALL PRECAUTIONS IN PLACE. CALL
LIGHT WITHIN REACH.

## 2021-08-11 NOTE — NUR
Nutrition: Pt admitted to COVID unit. ARDS. Assessed for LOS. H/o DM, COPD,
smoker. Proning pt when possible. Fluid overload. Wt: 137#. Pt has been given
convelescant plasma and remdesivir. Unable to get ahold of RN on two attempts
today. No intake records. Labs: -118, alb 3.1, prealb 16.9, WBC 14.6.
PLEASE RECORD MEAL INTAKE %.
Consider mild risk for now. RD to follow up on % intake, wt, labs 8/13/21.

## 2021-08-12 VITALS — DIASTOLIC BLOOD PRESSURE: 46 MMHG | SYSTOLIC BLOOD PRESSURE: 122 MMHG

## 2021-08-12 VITALS — DIASTOLIC BLOOD PRESSURE: 51 MMHG | SYSTOLIC BLOOD PRESSURE: 109 MMHG

## 2021-08-12 VITALS — SYSTOLIC BLOOD PRESSURE: 136 MMHG | DIASTOLIC BLOOD PRESSURE: 39 MMHG

## 2021-08-12 VITALS — SYSTOLIC BLOOD PRESSURE: 120 MMHG | DIASTOLIC BLOOD PRESSURE: 75 MMHG

## 2021-08-12 VITALS — DIASTOLIC BLOOD PRESSURE: 46 MMHG | SYSTOLIC BLOOD PRESSURE: 134 MMHG

## 2021-08-12 LAB
ANION GAP SERPL CALC-SCNC: 5 MMOL/L (ref 7–16)
BUN SERPL-MCNC: 45 MG/DL (ref 7–18)
CALCIUM SERPL-MCNC: 8.2 MG/DL (ref 8.5–10.1)
CHLORIDE SERPL-SCNC: 102 MMOL/L (ref 98–107)
CO2 SERPL-SCNC: 28 MMOL/L (ref 21–32)
CREAT SERPL-MCNC: 1.2 MG/DL (ref 0.6–1.3)
GLUCOSE SERPL-MCNC: 105 MG/DL (ref 70–99)
HCT VFR BLD CALC: 28.6 % (ref 37–47)
HGB BLD-MCNC: 9.4 GM/DL (ref 12–15)
MCH RBC QN AUTO: 28.3 PG (ref 26–34)
MCHC RBC AUTO-ENTMCNC: 32.7 G/DL (ref 28–37)
MCV RBC: 86.6 FL (ref 80–100)
MPV: 8 FL. (ref 7.2–11.1)
PLATELET COUNT*: 268 THOU/UL (ref 150–400)
POTASSIUM SERPL-SCNC: 5.1 MMOL/L (ref 3.5–5.1)
RBC # BLD AUTO: 3.3 MIL/UL (ref 4.2–5)
RDW-CV: 13.1 % (ref 10.5–14.5)
SODIUM SERPL-SCNC: 135 MMOL/L (ref 136–145)
WBC # BLD AUTO: 10.9 THOU/UL (ref 4–11)

## 2021-08-12 PROCEDURE — 5A09357 ASSISTANCE WITH RESPIRATORY VENTILATION, LESS THAN 24 CONSECUTIVE HOURS, CONTINUOUS POSITIVE AIRWAY PRESSURE: ICD-10-PCS | Performed by: INTERNAL MEDICINE

## 2021-08-12 NOTE — NUR
Covid positive. Anticipate dc tomorrow. On bipap 30% fio2. 2L o2. Therapies
to see. Plan ex ox at dc

## 2021-08-12 NOTE — NUR
ASSUMED CARE OF PT AFTER REPORT AT 1930. PT A&OX4. VSS. PHYSICAL ASSESSMENT
COMPLETED AND CHARTED. PT ON HFNC 2L/BIPAP 30%. PT TRACING AVPACED. PT DENIES
ANY PAIN. PT UPSTANDBY. FALL PRECAUTIONS IN PLACE. CALL LIGHT WITHIN REACH.

## 2021-08-12 NOTE — CON
[address]  [address]                    CONSULTATION                  
_______________________________________________________________________________
 
Name:       FRANDY DIAZ            Room:           [address] [place]  
M.R.#:  [id number]      Account #:      [id number]  
Admission:  08/04/21     Attend Phys:    Diamond Horne MD 
Discharge:               Date of Birth:  [birth year]  
         Report #: [id number]
                                                                     [id number]
_______________________________________________________________________________
THIS REPORT FOR:  
 
cc:  Roberta Martins MD, Sarah Beth MD Pervez, Adeel MD                                                   ~
 
 
DATE OF CONSULTATION: 08/06/2021
 
REQUESTING PHYSICIAN:  Dr. Smith.
 
INDICATION FOR CONSULTATION:  Acute hypoxemic respiratory failure secondary to 
COVID-19.
 
HISTORY OF PRESENT ILLNESS:  The patient is an 81-year-old female with past 
medical history is as mentioned below.  She has an extensive history of smoking 
and still smokes.  She, however, does not carry a previous diagnosis of COPD.  
She has had a recent admission to this hospital with altered mental status and 
she does have moderate aortic stenosis.
 
At this time, the patient is again admitted this time with COVID-19.  The 
patient had reported cough, increasing shortness of breath, only a small amount 
of sputum production.  No chest pain.  She did not have upper respiratory 
complaints or swelling of lower extremities.  The patient earlier had had fever 
and chills with changes in appetite and weakness.
 
Since admission, the patient has become progressively more and more hypoxemic.  
Right now, she is on 100% FiO2, 55 liters flow and is barely saturating in the 
low 90s.  The patient does have significant shortness of breath at rest.  I 
obtained a chest x-ray, which shows considerable worsening compared with the 
previous chest x-ray performed on 08/04.
 
REVIEW OF SYSTEMS:  The patient's review of systems for 12 points is negative 
except as mentioned above.
 
PAST MEDICAL HISTORY:  Moderate aortic stenosis, left ventricular ejection 
fraction is normal, right heart pressures are mildly elevated to 40-45.  Her 
creatinine is normal.  She does have significant hypertension, diabetes, neck 
surgery, cholecystectomy, hyperlipidemia, hypothyroidism, anemia, 
gastroesophageal reflux disease.
 
SOCIAL HISTORY:  She has an extensive history of smoking of more than 50 years, 
still smokes half a pack a day.  No known history of heavy alcohol use or 
illegal drug use.
 
CURRENT MEDICATIONS:  List in [facility] reviewed.
 
 
 
[place] [address]                    CONSULTATION                  
_______________________________________________________________________________
 
Name:       FRANDY DIAZ            Room:           [address]#:  [id number]      Account #:      [id number]  
Admission:  08/04/21     Attend Phys:    Diamond Horne MD 
Discharge:               Date of Birth:  [birth year]  
         Report #: [id number]
                                                                     [id number]
_______________________________________________________________________________
 
 
 
HOME MEDICATIONS:  List in [facility] reviewed.
 
FAMILY HISTORY:  No pertinent family history.
 
ALLERGIES:  She has had an adverse reaction to Cipro and metronidazole.
 
PHYSICAL EXAMINATION:
GENERAL:  She is alert, awake and oriented.  She is saturating only 91-92%, 100%
FiO2, 55 liters flow.
VITAL SIGNS:  Pulse 77, respiratory rate mid 20s, blood pressure 120/70.  She is
afebrile with a temperature of 36.0.
HEENT:  Normocephalic and atraumatic.
NECK:  Does not show raised JVP asymmetry, mass or lymph nodes.
CHEST:  Symmetrical expansion on inspection and palpation.  On auscultation, 
breath sounds are decreased, equal.  I do not hear any added sounds.
HEART:  Regular.  There is a soft systolic murmur.
ABDOMEN:  Soft and nontender.
EXTREMITIES:  Lower extremities show no edema and no calf tenderness.
SKIN:  Dry and intact.
NEUROLOGIC:  Moves all extremities bilaterally equally and spontaneously with no
focal deficit identified.
 
LABORATORY DATA:  The patient's chest x-ray, which is significantly worse than 
the one performed on the 4th in [facility], reviewed.  CT chest and venous 
Doppler, consistent with COVID-19.  There are extensive infiltrates.  There are 
no pulmonary emboli.  The patient's lab work is in [facility] and this is 
reviewed.
 
ASSESSMENT AND PLAN:
1.  Acute hypoxemic respiratory failure and acute respiratory distress syndrome 
secondary to COVID-19.  Continue to titrate oxygen.  Avoid supine sleep, if 
possible prone, otherwise on sides.  Out of bed to chair if feasible, add BiPAP 
while asleep.
2.  COVID-19.  Continue dexamethasone.  Considering severe hypoxemia as well as 
the fact that the patient likely has underlying COPD, I will increase the dose 
of dexamethasone.  Note that the patient has had altered mental status recently,
dexamethasone can cause altered mental status.  Incidence of altered mental 
status is higher with dexamethasone compared with Solu-Medrol.  In case she has 
mental status changes, I will switch her to Solu-Medrol.  Continue remdesivir, 
received Actemra earlier.  We will give her 1 unit of convalescent plasma again 
today, received 1 unit previously.
3.  Pulmonary infiltrates, primarily these appear to be secondary to COVID-19.  
However, considering severe hypoxemia as well as recent hospitalization, I went 
 
 
 
[address]  [address]                    CONSULTATION                  
_______________________________________________________________________________
 
Name:       FRANDY DIAZ            Room:           [address] [place]  
..#:  [id number]      Account #:      [id number]  
Admission:  08/04/21     Attend Phys:    Diamond Horne MD 
Discharge:               Date of Birth:  [birth year]  
         Report #: [id number]
                                                                     [id number]
_______________________________________________________________________________
 
 
ahead and switched ceftriaxone over to cefepime and remains on Zithromax.  We 
will do a nasal swab for MRSA if feasible, then we will do a sputum culture as 
well.
4.  Fluid overload/moderate aortic stenosis.  I would like to run her on the 
drier side in order to facilitate diuresis.  We will run the blood pressure a 
bit on the higher side.  Therefore, I discontinued hydralazine, remains on other
blood pressure medications.  We will give her 60 of Lasix with convalescent 
plasma today.  We will reevaluate tomorrow from more diuresis, may benefit from 
a Cardiology consult after recovery from current illness.
5.  Extensive history of smoking/suspected chronic obstructive pulmonary disease
exacerbation.  I also switched her Xopenex over to DuoNeb.  We are giving her 
steroid as above.  She still smokes.
6.  Diabetes/hyperglycemia.  May need more insulin with the additional amount of
steroid ordered.
7.  History of allergic rhinitis.  I will go ahead and discontinue Flonase to 
reduce the risk of epistaxis.
8.  Deep vein thrombosis prophylaxis.  Agree with moderate dose Lovenox.
9.  Gastrointestinal prophylaxis, on Pepcid.
10.  Clostridium difficile prophylaxis, Lactinex.
 
The patient is critically ill at this time.
 
Total time spent providing critical care to this patient today, exceeds 45 
minutes.
 
 
 
 
 
 
 
 
 
 
 
 
 
 
 
 
 
 
<ELECTRONICALLY SIGNED>
                                        By:  Torrey Brooks MD              
08/12/21     2323
D: 08/06/21 1605_______________________________________
T: 08/06/21 2049Ataty Brooks MD                 /nt

## 2021-08-12 NOTE — NUR
RECEIVED REPORT AROUND 0715. ASSUMED CARE. VS AND ASSESSMENT AS CHARTED. IV
INTACT. HEART MONITOR ATTACHED SR. MEDS GIVEN PER MAR. HOURLY ROUNDING
PERFORMED. PT SAT IN CHAIR MOST OF DAY. 3L NC. POSSIBLE D/C TOMORROW DEPENDING
ON REST AND EXERCISE. NO PAIN THIS SHIFT. COMPLAINED OF DIZZINESS THIS
AFTERNOON. TOLD PT TO DRINK FLUIDS. CALL LIGHT WITH IN REACH. WILL CONTINUE TO
MONITOR.

## 2021-08-13 VITALS — DIASTOLIC BLOOD PRESSURE: 46 MMHG | SYSTOLIC BLOOD PRESSURE: 116 MMHG

## 2021-08-13 VITALS — DIASTOLIC BLOOD PRESSURE: 43 MMHG | SYSTOLIC BLOOD PRESSURE: 124 MMHG

## 2021-08-13 VITALS — SYSTOLIC BLOOD PRESSURE: 105 MMHG | DIASTOLIC BLOOD PRESSURE: 35 MMHG

## 2021-08-13 VITALS — SYSTOLIC BLOOD PRESSURE: 110 MMHG | DIASTOLIC BLOOD PRESSURE: 48 MMHG

## 2021-08-13 VITALS — SYSTOLIC BLOOD PRESSURE: 112 MMHG | DIASTOLIC BLOOD PRESSURE: 39 MMHG

## 2021-08-13 VITALS — DIASTOLIC BLOOD PRESSURE: 42 MMHG | SYSTOLIC BLOOD PRESSURE: 117 MMHG

## 2021-08-13 LAB
ABSOLUTE BASOPHILS: 0 THOU/UL (ref 0–0.2)
ABSOLUTE EOSINOPHILS: 0 THOU/UL (ref 0–0.7)
ABSOLUTE MONOCYTES: 0.4 THOU/UL (ref 0–1.2)
ALBUMIN SERPL-MCNC: 2.5 G/DL (ref 3.4–5)
ALP SERPL-CCNC: 114 U/L (ref 46–116)
ALT SERPL-CCNC: 27 U/L (ref 30–65)
ANION GAP SERPL CALC-SCNC: 5 MMOL/L (ref 7–16)
AST SERPL-CCNC: 31 U/L (ref 15–37)
BASOPHILS NFR BLD AUTO: 0.2 %
BILIRUB SERPL-MCNC: 0.6 MG/DL
BUN SERPL-MCNC: 40 MG/DL (ref 7–18)
CALCIUM SERPL-MCNC: 8.2 MG/DL (ref 8.5–10.1)
CHLORIDE SERPL-SCNC: 100 MMOL/L (ref 98–107)
CO2 SERPL-SCNC: 29 MMOL/L (ref 21–32)
CREAT SERPL-MCNC: 1 MG/DL (ref 0.6–1.3)
EOSINOPHIL NFR BLD: 0.1 %
GLUCOSE SERPL-MCNC: 91 MG/DL (ref 70–99)
GRANULOCYTES NFR BLD MANUAL: 89.3 %
HCT VFR BLD CALC: 29.2 % (ref 37–47)
HGB BLD-MCNC: 9.5 GM/DL (ref 12–15)
LYMPHOCYTES # BLD: 0.6 THOU/UL (ref 0.8–5.3)
LYMPHOCYTES NFR BLD AUTO: 6.4 %
MAGNESIUM SERPL-MCNC: 2 MG/DL (ref 1.8–2.4)
MCH RBC QN AUTO: 28.2 PG (ref 26–34)
MCHC RBC AUTO-ENTMCNC: 32.4 G/DL (ref 28–37)
MCV RBC: 86.8 FL (ref 80–100)
MONOCYTES NFR BLD: 4 %
MPV: 8.2 FL. (ref 7.2–11.1)
NEUTROPHILS # BLD: 8.8 THOU/UL (ref 1.6–8.1)
NUCLEATED RBCS: 0 /100WBC
PLATELET COUNT*: 249 THOU/UL (ref 150–400)
POTASSIUM SERPL-SCNC: 5.1 MMOL/L (ref 3.5–5.1)
PROT SERPL-MCNC: 5.5 G/DL (ref 6.4–8.2)
RBC # BLD AUTO: 3.36 MIL/UL (ref 4.2–5)
RDW-CV: 13.4 % (ref 10.5–14.5)
SODIUM SERPL-SCNC: 134 MMOL/L (ref 136–145)
WBC # BLD AUTO: 9.8 THOU/UL (ref 4–11)

## 2021-08-13 PROCEDURE — 5A09357 ASSISTANCE WITH RESPIRATORY VENTILATION, LESS THAN 24 CONSECUTIVE HOURS, CONTINUOUS POSITIVE AIRWAY PRESSURE: ICD-10-PCS | Performed by: INTERNAL MEDICINE

## 2021-08-13 PROCEDURE — 5A0935A ASSISTANCE WITH RESPIRATORY VENTILATION, LESS THAN 24 CONSECUTIVE HOURS, HIGH NASAL FLOW/VELOCITY: ICD-10-PCS

## 2021-08-13 NOTE — NUR
DC delayed, Pt o2 needs up to 4L today from 2L this morning. CM spoke with
Dtr, dtr concerned about Pt discharging home. Plan for therapies to assess Pt
today/this weekend, and goal will be dc to ARU on Monday. Per ID nurse, Pt
should be out of isolation this weekend. CM to remain in contact with family

## 2021-08-13 NOTE — NUR
ASSUMED CARE OF PT AT 1900. PT IS ALERT AND ORIENTED. VSS. PERRLA. PT IS ON 3
LITERS. NO COMPLAINTS OF PAIN. PT IS V PACED ON THE TELEMETRY. PT IS RESTING
COMFORTABLY IN BED. RESPIRATIONS ARE EVEN AND NONLABORED. WILL CONTINUE TO
MONITOR PT.

## 2021-08-13 NOTE — NUR
RECEIVED REPORT AROUND 0715. ASSUMED CARE. VS AND ASSESSMENT AS CHARTED.
IV INTACT LEFT FOREARM. HEART MONITOR ATTACHED AT SR. PT UP IN CHAIR FOR PART
OF DAY. 4L NC. NO PAIN THIS SHIFT. MEDS GIVEN PER MAR. HOURLY ROUNDING
PERFORMED. ISOLATION INTACT. UPDATED DAUGHTER THIS SHIFT. CALL LIGHT WITH
INREACH. WILL CONTINUE TO MONITOR.

## 2021-08-13 NOTE — NUR
Nutrition: Reassessment. Spoke with pt's RN. Pt is eating fair amount, ~50%.
Off bipap. On 4L O2. Labs: albumin 2.5, prealb 16.9, . Has BLE edema. Wt
stable. SSI. Continue at mild risk. Will follow up per protocol.

## 2021-08-14 VITALS — SYSTOLIC BLOOD PRESSURE: 100 MMHG | DIASTOLIC BLOOD PRESSURE: 34 MMHG

## 2021-08-14 VITALS — SYSTOLIC BLOOD PRESSURE: 126 MMHG | DIASTOLIC BLOOD PRESSURE: 48 MMHG

## 2021-08-14 VITALS — DIASTOLIC BLOOD PRESSURE: 44 MMHG | SYSTOLIC BLOOD PRESSURE: 124 MMHG

## 2021-08-14 VITALS — DIASTOLIC BLOOD PRESSURE: 45 MMHG | SYSTOLIC BLOOD PRESSURE: 137 MMHG

## 2021-08-14 VITALS — DIASTOLIC BLOOD PRESSURE: 49 MMHG | SYSTOLIC BLOOD PRESSURE: 133 MMHG

## 2021-08-14 VITALS — DIASTOLIC BLOOD PRESSURE: 43 MMHG | SYSTOLIC BLOOD PRESSURE: 118 MMHG

## 2021-08-14 PROCEDURE — 5A09357 ASSISTANCE WITH RESPIRATORY VENTILATION, LESS THAN 24 CONSECUTIVE HOURS, CONTINUOUS POSITIVE AIRWAY PRESSURE: ICD-10-PCS | Performed by: INTERNAL MEDICINE

## 2021-08-14 NOTE — NUR
ASSUMED CARE OF PT AT 1900. PT IS ALERT AND ORIENTED. VSS. PERRLA. PT IS ON 2
LITERS O2. PT IS V PACED ON THE TELEMETRY. PT IS SLEEPING COMFORTABLY IN BED.
RESPIRATIONS ARE EVEN AND NONLABORED. WILL CONTINUE TO MONITOR PT.

## 2021-08-14 NOTE — NUR
RECEIVED REPORT AROUND 0715. ASSUMED CARE. VS AND ASSESSMENT AS CHARTED. IV
INTACT. HEART MONITOR ATTACHED AT SR. PT UP IN CHAIR. NO PAIN THIS SHIFT.
UPDATED DAUGHTER ON PT. MEDS GIVEN PER MAR. HOURLY ROUNDING PERFORMED.
ISOLATION INTACT. CALL LIGHT WITH IN REACH. WILL CONTINUE TO MONITOR.

## 2021-08-14 NOTE — NUR
ASSUMED CARE OF PT AT 1900. PT IS ALERT AND ORIENTED. VSS. PERRLA. NO
COMPLAINTS OF PAIN. PT IS ON 3 LITERS O2. PT IS V PACED ON THE TELEMETRY. PT
IS RESTING COMFORTABLY IN BED. RESPIRATIONS ARE EVEN AND NONLABORED. WILL
CONTINUE TO MONITOR PT.

## 2021-08-15 VITALS — SYSTOLIC BLOOD PRESSURE: 106 MMHG | DIASTOLIC BLOOD PRESSURE: 43 MMHG

## 2021-08-15 VITALS — SYSTOLIC BLOOD PRESSURE: 111 MMHG | DIASTOLIC BLOOD PRESSURE: 37 MMHG

## 2021-08-15 VITALS — DIASTOLIC BLOOD PRESSURE: 54 MMHG | SYSTOLIC BLOOD PRESSURE: 119 MMHG

## 2021-08-15 VITALS — DIASTOLIC BLOOD PRESSURE: 44 MMHG | SYSTOLIC BLOOD PRESSURE: 130 MMHG

## 2021-08-15 VITALS — DIASTOLIC BLOOD PRESSURE: 54 MMHG | SYSTOLIC BLOOD PRESSURE: 130 MMHG

## 2021-08-15 LAB
ABSOLUTE MONOCYTES: 0.2 THOU/UL (ref 0–1.2)
ALBUMIN SERPL-MCNC: 2.9 G/DL (ref 3.4–5)
ALP SERPL-CCNC: 106 U/L (ref 46–116)
ALT SERPL-CCNC: 22 U/L (ref 30–65)
ANION GAP SERPL CALC-SCNC: 3 MMOL/L (ref 7–16)
AST SERPL-CCNC: 27 U/L (ref 15–37)
BILIRUB SERPL-MCNC: 0.5 MG/DL
BUN SERPL-MCNC: 39 MG/DL (ref 7–18)
CALCIUM SERPL-MCNC: 8.5 MG/DL (ref 8.5–10.1)
CHLORIDE SERPL-SCNC: 99 MMOL/L (ref 98–107)
CO2 SERPL-SCNC: 31 MMOL/L (ref 21–32)
CREAT SERPL-MCNC: 1 MG/DL (ref 0.6–1.3)
GLUCOSE SERPL-MCNC: 113 MG/DL (ref 70–99)
GRANULOCYTES NFR BLD MANUAL: 89 %
HCT VFR BLD CALC: 27.1 % (ref 37–47)
HGB BLD-MCNC: 9.3 GM/DL (ref 12–15)
LYMPHOCYTES # BLD: 0.9 THOU/UL (ref 0.8–5.3)
LYMPHOCYTES NFR BLD AUTO: 9 %
MCH RBC QN AUTO: 29.2 PG (ref 26–34)
MCHC RBC AUTO-ENTMCNC: 34.1 G/DL (ref 28–37)
MCV RBC: 85.5 FL (ref 80–100)
MONOCYTES NFR BLD: 2 %
MPV: 8.7 FL. (ref 7.2–11.1)
NEUTROPHILS # BLD: 9.3 THOU/UL (ref 1.6–8.1)
NUCLEATED RBCS: 0 /100WBC
PLATELET # BLD EST: ADEQUATE 10*3/UL
PLATELET COUNT*: 204 THOU/UL (ref 150–400)
POTASSIUM SERPL-SCNC: 5.2 MMOL/L (ref 3.5–5.1)
PROT SERPL-MCNC: 5.6 G/DL (ref 6.4–8.2)
RBC # BLD AUTO: 3.17 MIL/UL (ref 4.2–5)
RBC MORPH BLD: NORMAL
RDW-CV: 13.1 % (ref 10.5–14.5)
SODIUM SERPL-SCNC: 133 MMOL/L (ref 136–145)
WBC # BLD AUTO: 10.4 THOU/UL (ref 4–11)

## 2021-08-15 NOTE — NUR
PATIENT HAS REMAINED A&OX4, PLEASANT AND COOPERATIVE WITH CARES THIS SHIFT.
PATIENT ON O2@5L VIA NASAL CANNULA, DENIES SOA; IS V-PACED ON THE MONITOR.
PATIENT UP TO BSC WITH STANDBY ASSIST. MEDICATIONS ADMINISTERED AS ORDERED.
PATIENT MOVED TO ROOM 101 FROM 107 D/T NO LONGER NEEDING NEBULIZER TREATMENTS.
CALL LIGHT AND FREQUENTLY USED ITEMS WITHIN REACH.

## 2021-08-15 NOTE — NUR
ASSUMED PATIENT CARE AT APPROXIMATELY 0110 FROM NIGHT NURSE JAROCHO. I
AGREE WITH PRIOR NURSE ASSESSMENT. PATIENT HAS SLEPT WELL THROUGHOUT THE
NIGHT. VSS ON 3L 02 VIA NASAL CANNULA.  MEDICATION GIVEN AS ORDERED AND
CHARTED. IV IN LEFT FOREARM-SL. PATIENT INSTRUCTED TO USE CALL LIGHT WHEN
NEEDING ASSISTANCE. HOURLY ROUNDS MADE. WILL CONTINUE WITH PLAN OF CARE AND
NURSING TO MONITOR.

## 2021-08-16 VITALS — DIASTOLIC BLOOD PRESSURE: 34 MMHG | SYSTOLIC BLOOD PRESSURE: 101 MMHG

## 2021-08-16 VITALS — SYSTOLIC BLOOD PRESSURE: 127 MMHG | DIASTOLIC BLOOD PRESSURE: 49 MMHG

## 2021-08-16 VITALS — SYSTOLIC BLOOD PRESSURE: 113 MMHG | DIASTOLIC BLOOD PRESSURE: 49 MMHG

## 2021-08-16 VITALS — SYSTOLIC BLOOD PRESSURE: 121 MMHG | DIASTOLIC BLOOD PRESSURE: 40 MMHG

## 2021-08-16 VITALS — SYSTOLIC BLOOD PRESSURE: 106 MMHG | DIASTOLIC BLOOD PRESSURE: 53 MMHG

## 2021-08-16 VITALS — DIASTOLIC BLOOD PRESSURE: 87 MMHG | SYSTOLIC BLOOD PRESSURE: 104 MMHG

## 2021-08-16 LAB
ABSOLUTE BASOPHILS: 0 THOU/UL (ref 0–0.2)
ABSOLUTE EOSINOPHILS: 0 THOU/UL (ref 0–0.7)
ABSOLUTE MONOCYTES: 0.5 THOU/UL (ref 0–1.2)
ALBUMIN SERPL-MCNC: 2.8 G/DL (ref 3.4–5)
ALP SERPL-CCNC: 117 U/L (ref 46–116)
ALT SERPL-CCNC: 23 U/L (ref 30–65)
ANION GAP SERPL CALC-SCNC: 1 MMOL/L (ref 7–16)
AST SERPL-CCNC: 25 U/L (ref 15–37)
BASOPHILS NFR BLD AUTO: 0.1 %
BILIRUB SERPL-MCNC: 0.4 MG/DL
BUN SERPL-MCNC: 36 MG/DL (ref 7–18)
CALCIUM SERPL-MCNC: 8.3 MG/DL (ref 8.5–10.1)
CHLORIDE SERPL-SCNC: 100 MMOL/L (ref 98–107)
CO2 SERPL-SCNC: 34 MMOL/L (ref 21–32)
CREAT SERPL-MCNC: 1 MG/DL (ref 0.6–1.3)
EOSINOPHIL NFR BLD: 0.2 %
GLUCOSE SERPL-MCNC: 45 MG/DL (ref 70–99)
GRANULOCYTES NFR BLD MANUAL: 89.7 %
HCT VFR BLD CALC: 28 % (ref 37–47)
HGB BLD-MCNC: 9.1 GM/DL (ref 12–15)
LYMPHOCYTES # BLD: 0.6 THOU/UL (ref 0.8–5.3)
LYMPHOCYTES NFR BLD AUTO: 5.2 %
MCH RBC QN AUTO: 28.2 PG (ref 26–34)
MCHC RBC AUTO-ENTMCNC: 32.6 G/DL (ref 28–37)
MCV RBC: 86.6 FL (ref 80–100)
MONOCYTES NFR BLD: 4.8 %
MPV: 9 FL. (ref 7.2–11.1)
NEUTROPHILS # BLD: 9.6 THOU/UL (ref 1.6–8.1)
NUCLEATED RBCS: 0 /100WBC
PLATELET COUNT*: 190 THOU/UL (ref 150–400)
POTASSIUM SERPL-SCNC: 5.3 MMOL/L (ref 3.5–5.1)
PROT SERPL-MCNC: 5.6 G/DL (ref 6.4–8.2)
RBC # BLD AUTO: 3.24 MIL/UL (ref 4.2–5)
RDW-CV: 13 % (ref 10.5–14.5)
SODIUM SERPL-SCNC: 135 MMOL/L (ref 136–145)
WBC # BLD AUTO: 10.7 THOU/UL (ref 4–11)

## 2021-08-16 PROCEDURE — 5A0935A ASSISTANCE WITH RESPIRATORY VENTILATION, LESS THAN 24 CONSECUTIVE HOURS, HIGH NASAL FLOW/VELOCITY: ICD-10-PCS | Performed by: INTERNAL MEDICINE

## 2021-08-16 NOTE — NUR
PATIENT TRANSFER WITH ASSIST TO CHAIR. SPO2 NOT RECOVERING WITHOUT INCERASED
OXYGEN FLOW RATE SLOWLY TO 10L/MIN SPO2 92% REQUIRED 8L/MIN HF NASAL CANNULA
TO EAT LUNCH AT REST ABLE TO TITRATE OXYGEN DOWN TO 5 L/MIN NASAL CANNULA. RT
AWARE. MD AWARE.

## 2021-08-16 NOTE — NUR
Pt out of covid iso today. On 5L o2. Pt had to be increased to 10L earlier
today, back down to 5L. Plan ARU at dc, anticipate dc tomorrow pending pulm.
Updated Pt's dtr.

## 2021-08-16 NOTE — NUR
PT SLEPT MOST OF SHIFT. ASSESSMENT AS DOCUMENTED. MEDS GIVEN PER E-MAR. IV
PATENT. NO REPORTS OF PAIN. FALL PRECAUTIONS IN PLACE. PT ON 5L NC THIS SHIFT.
PT ABLE TO MAKE NEEDS KNOWN. WILL CONTINUE WITH PLAN OF CARE.

## 2021-08-17 VITALS — DIASTOLIC BLOOD PRESSURE: 45 MMHG | SYSTOLIC BLOOD PRESSURE: 133 MMHG

## 2021-08-17 VITALS — DIASTOLIC BLOOD PRESSURE: 38 MMHG | SYSTOLIC BLOOD PRESSURE: 120 MMHG

## 2021-08-17 VITALS — SYSTOLIC BLOOD PRESSURE: 132 MMHG | DIASTOLIC BLOOD PRESSURE: 53 MMHG

## 2021-08-17 VITALS — DIASTOLIC BLOOD PRESSURE: 39 MMHG | SYSTOLIC BLOOD PRESSURE: 113 MMHG

## 2021-08-17 VITALS — SYSTOLIC BLOOD PRESSURE: 151 MMHG | DIASTOLIC BLOOD PRESSURE: 49 MMHG

## 2021-08-17 VITALS — SYSTOLIC BLOOD PRESSURE: 124 MMHG | DIASTOLIC BLOOD PRESSURE: 38 MMHG

## 2021-08-17 VITALS — SYSTOLIC BLOOD PRESSURE: 117 MMHG | DIASTOLIC BLOOD PRESSURE: 58 MMHG

## 2021-08-17 LAB
ABSOLUTE BASOPHILS: 0 THOU/UL (ref 0–0.2)
ABSOLUTE EOSINOPHILS: 0.1 THOU/UL (ref 0–0.7)
ABSOLUTE MONOCYTES: 0.6 THOU/UL (ref 0–1.2)
ALBUMIN SERPL-MCNC: 2.7 G/DL (ref 3.4–5)
ALP SERPL-CCNC: 122 U/L (ref 46–116)
ALT SERPL-CCNC: 22 U/L (ref 30–65)
ANION GAP SERPL CALC-SCNC: 0 MMOL/L (ref 7–16)
AST SERPL-CCNC: 22 U/L (ref 15–37)
BASOPHILS NFR BLD AUTO: 0.1 %
BILIRUB SERPL-MCNC: 0.5 MG/DL
BUN SERPL-MCNC: 36 MG/DL (ref 7–18)
CALCIUM SERPL-MCNC: 8.3 MG/DL (ref 8.5–10.1)
CHLORIDE SERPL-SCNC: 98 MMOL/L (ref 98–107)
CO2 SERPL-SCNC: 35 MMOL/L (ref 21–32)
CREAT SERPL-MCNC: 0.9 MG/DL (ref 0.6–1.3)
EOSINOPHIL NFR BLD: 0.5 %
GLUCOSE SERPL-MCNC: 67 MG/DL (ref 70–99)
GRANULOCYTES NFR BLD MANUAL: 87.9 %
HCT VFR BLD CALC: 27.6 % (ref 37–47)
HGB BLD-MCNC: 9.1 GM/DL (ref 12–15)
LYMPHOCYTES # BLD: 0.7 THOU/UL (ref 0.8–5.3)
LYMPHOCYTES NFR BLD AUTO: 6.2 %
MAGNESIUM SERPL-MCNC: 2.4 MG/DL (ref 1.8–2.4)
MCH RBC QN AUTO: 28.4 PG (ref 26–34)
MCHC RBC AUTO-ENTMCNC: 33.1 G/DL (ref 28–37)
MCV RBC: 85.9 FL (ref 80–100)
MONOCYTES NFR BLD: 5.3 %
MPV: 9 FL. (ref 7.2–11.1)
NEUTROPHILS # BLD: 10.6 THOU/UL (ref 1.6–8.1)
NUCLEATED RBCS: 0 /100WBC
PLATELET COUNT*: 186 THOU/UL (ref 150–400)
POTASSIUM SERPL-SCNC: 5.2 MMOL/L (ref 3.5–5.1)
PROT SERPL-MCNC: 5.8 G/DL (ref 6.4–8.2)
RBC # BLD AUTO: 3.22 MIL/UL (ref 4.2–5)
RDW-CV: 12.7 % (ref 10.5–14.5)
SODIUM SERPL-SCNC: 133 MMOL/L (ref 136–145)
WBC # BLD AUTO: 12 THOU/UL (ref 4–11)

## 2021-08-17 PROCEDURE — 5A0935A ASSISTANCE WITH RESPIRATORY VENTILATION, LESS THAN 24 CONSECUTIVE HOURS, HIGH NASAL FLOW/VELOCITY: ICD-10-PCS | Performed by: INTERNAL MEDICINE

## 2021-08-17 NOTE — NUR
CARDIAC MONITOR TRACKING AV PACED TO VPACED RHYTHM.  UP IN CHAIR, VOIDING IN
BSC CALLING FOR ASSIST WHEN NEEDING TO GET UP.  UP IN CHAIR MOST OF AFTERNOON.
02 ON 6L PER NC, ATTEMPTED TO TITRATE O2 IN AM WITH NO SUCCESS - 02 SAT
DROPPING TO 81% ON 4L.  TOLERATING DIET, NO COMPLAINTS OF PAIN TO NURSING.
WEAKNESS NOTED.  CALL LIGHT WITHIN REACH, WILL CONTINUE WITH PLAN OF CARE.

## 2021-08-17 NOTE — NUR
Anticipate dc tomorrow. Therapies to see. On 6L, wean to 5L or if Pt remains
on 6L tomorrow, plan to ARU. Updated dtr.

## 2021-08-17 NOTE — NUR
PT SLEPT MOST OF SHIFT. ASSESSMENT AS DOCUMENTED. MEDS GIVEN PER E-MAR. IV
PATENT. NO REPORTS OF PAIN. FALL PRECAUTIONS IN PLACE. PT ON 6L NC THIS SHIFT.
IS USED. PT ABLE TO MAKE NEEDS KNOWN. WILL CONTINUE WITH PLAN OF CARE.

## 2021-08-18 VITALS — DIASTOLIC BLOOD PRESSURE: 76 MMHG | SYSTOLIC BLOOD PRESSURE: 133 MMHG

## 2021-08-18 VITALS — SYSTOLIC BLOOD PRESSURE: 120 MMHG | DIASTOLIC BLOOD PRESSURE: 44 MMHG

## 2021-08-18 VITALS — SYSTOLIC BLOOD PRESSURE: 147 MMHG | DIASTOLIC BLOOD PRESSURE: 55 MMHG

## 2021-08-18 VITALS — DIASTOLIC BLOOD PRESSURE: 39 MMHG | SYSTOLIC BLOOD PRESSURE: 115 MMHG

## 2021-08-18 LAB
ABSOLUTE MONOCYTES: 0.5 THOU/UL (ref 0–1.2)
ANION GAP SERPL CALC-SCNC: 0 MMOL/L (ref 7–16)
BUN SERPL-MCNC: 35 MG/DL (ref 7–18)
CALCIUM SERPL-MCNC: 8.4 MG/DL (ref 8.5–10.1)
CHLORIDE SERPL-SCNC: 94 MMOL/L (ref 98–107)
CO2 SERPL-SCNC: 37 MMOL/L (ref 21–32)
CREAT SERPL-MCNC: 1 MG/DL (ref 0.6–1.3)
GLUCOSE SERPL-MCNC: 138 MG/DL (ref 70–99)
GRANULOCYTES NFR BLD MANUAL: 87 %
HCT VFR BLD CALC: 27.8 % (ref 37–47)
HGB BLD-MCNC: 9.1 GM/DL (ref 12–15)
LYMPHOCYTES # BLD: 0.8 THOU/UL (ref 0.8–5.3)
LYMPHOCYTES NFR BLD AUTO: 8 %
MAGNESIUM SERPL-MCNC: 2 MG/DL (ref 1.8–2.4)
MCH RBC QN AUTO: 28.3 PG (ref 26–34)
MCHC RBC AUTO-ENTMCNC: 32.6 G/DL (ref 28–37)
MCV RBC: 86.8 FL (ref 80–100)
MONOCYTES NFR BLD: 5 %
MPV: 9.1 FL. (ref 7.2–11.1)
NEUTROPHILS # BLD: 9 THOU/UL (ref 1.6–8.1)
NUCLEATED RBCS: 0 /100WBC
PLATELET # BLD EST: ADEQUATE 10*3/UL
PLATELET COUNT*: 181 THOU/UL (ref 150–400)
POTASSIUM SERPL-SCNC: 5 MMOL/L (ref 3.5–5.1)
RBC # BLD AUTO: 3.2 MIL/UL (ref 4.2–5)
RBC MORPH BLD: NORMAL
RDW-CV: 13 % (ref 10.5–14.5)
SODIUM SERPL-SCNC: 131 MMOL/L (ref 136–145)
WBC # BLD AUTO: 10.3 THOU/UL (ref 4–11)

## 2021-08-18 PROCEDURE — 5A0935A ASSISTANCE WITH RESPIRATORY VENTILATION, LESS THAN 24 CONSECUTIVE HOURS, HIGH NASAL FLOW/VELOCITY: ICD-10-PCS | Performed by: INTERNAL MEDICINE

## 2021-08-18 NOTE — NUR
PATIENT RESTING UP IN CHAIR. PATIENT IS UP WITH MINIMAL ASSIST. PATIENT HAS
DYSPNEA WITH EXERTION. PATIENT'S OXYGEN NEEDS INCREASED THIS AM WITH MOVEMENT
TO CHAIR TO 8L/NC. PATIENT CURRENTLY AT 7L/NC. DISCHARGE TO REHAB HELD DUE TO
ROOM AVAILIBILTY AND INCREASED O2 NEEDS. DISCHARGE PLANNED FOR TOMORROW TO
[facility]UTE REHAB. PATIENT DENIES ANY NEEDS AT THIS TIME. CALL LIGHT WITHIN REACH.

## 2021-08-18 NOTE — NUR
PT SLEPT MOST OF SHIFT. ASSESSMENT AS DOCUMENTED. MEDS GIVEN PER E-MAR. IV
PATENT. NO REPORTS OF PAIN. PT ON 4L NC THIS SHIFT. PT ABLE TO MAKE NEEDS
KNOWN. WILL CONTINUE WITH PLAN OF CARE.

## 2021-08-19 ENCOUNTER — HOSPITAL ENCOUNTER (INPATIENT)
Dept: HOSPITAL 96 - M.REH | Age: 81
LOS: 18 days | Discharge: ANOTHER HOSPITAL | DRG: 947 | End: 2021-09-06
Attending: PHYSICAL MEDICINE & REHABILITATION | Admitting: PHYSICAL MEDICINE & REHABILITATION
Payer: MEDICARE

## 2021-08-19 VITALS — HEIGHT: 60.98 IN | WEIGHT: 133.8 LBS | BODY MASS INDEX: 25.26 KG/M2

## 2021-08-19 VITALS — SYSTOLIC BLOOD PRESSURE: 85 MMHG | DIASTOLIC BLOOD PRESSURE: 40 MMHG

## 2021-08-19 VITALS — DIASTOLIC BLOOD PRESSURE: 43 MMHG | SYSTOLIC BLOOD PRESSURE: 126 MMHG

## 2021-08-19 VITALS — SYSTOLIC BLOOD PRESSURE: 123 MMHG | DIASTOLIC BLOOD PRESSURE: 47 MMHG

## 2021-08-19 VITALS — DIASTOLIC BLOOD PRESSURE: 45 MMHG | SYSTOLIC BLOOD PRESSURE: 134 MMHG

## 2021-08-19 VITALS — SYSTOLIC BLOOD PRESSURE: 146 MMHG | DIASTOLIC BLOOD PRESSURE: 52 MMHG

## 2021-08-19 VITALS — SYSTOLIC BLOOD PRESSURE: 109 MMHG | DIASTOLIC BLOOD PRESSURE: 50 MMHG

## 2021-08-19 VITALS — SYSTOLIC BLOOD PRESSURE: 121 MMHG | DIASTOLIC BLOOD PRESSURE: 70 MMHG

## 2021-08-19 VITALS — SYSTOLIC BLOOD PRESSURE: 142 MMHG | DIASTOLIC BLOOD PRESSURE: 48 MMHG

## 2021-08-19 VITALS — DIASTOLIC BLOOD PRESSURE: 70 MMHG | SYSTOLIC BLOOD PRESSURE: 121 MMHG

## 2021-08-19 DIAGNOSIS — R53.81: Primary | ICD-10-CM

## 2021-08-19 DIAGNOSIS — Z88.8: ICD-10-CM

## 2021-08-19 DIAGNOSIS — D64.9: ICD-10-CM

## 2021-08-19 DIAGNOSIS — Z88.1: ICD-10-CM

## 2021-08-19 DIAGNOSIS — U07.1: ICD-10-CM

## 2021-08-19 DIAGNOSIS — I35.0: ICD-10-CM

## 2021-08-19 DIAGNOSIS — E78.00: ICD-10-CM
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
## 2021-08-19 DIAGNOSIS — E03.9: ICD-10-CM

## 2021-08-19 DIAGNOSIS — E78.5: ICD-10-CM

## 2021-08-19 DIAGNOSIS — Z90.49: ICD-10-CM

## 2021-08-19 DIAGNOSIS — Z91.19: ICD-10-CM

## 2021-08-19 DIAGNOSIS — J98.2: ICD-10-CM

## 2021-08-19 DIAGNOSIS — J12.82: ICD-10-CM

## 2021-08-19 DIAGNOSIS — Z87.891: ICD-10-CM

## 2021-08-19 DIAGNOSIS — T70.29XA: ICD-10-CM

## 2021-08-19 DIAGNOSIS — I10: ICD-10-CM

## 2021-08-19 DIAGNOSIS — J80: ICD-10-CM

## 2021-08-19 DIAGNOSIS — E11.65: ICD-10-CM

## 2021-08-19 PROCEDURE — 5A0935A ASSISTANCE WITH RESPIRATORY VENTILATION, LESS THAN 24 CONSECUTIVE HOURS, HIGH NASAL FLOW/VELOCITY: ICD-10-PCS | Performed by: INTERNAL MEDICINE

## 2021-08-20 VITALS — SYSTOLIC BLOOD PRESSURE: 104 MMHG | DIASTOLIC BLOOD PRESSURE: 30 MMHG

## 2021-08-20 VITALS — SYSTOLIC BLOOD PRESSURE: 145 MMHG | DIASTOLIC BLOOD PRESSURE: 45 MMHG

## 2021-08-20 LAB
ANION GAP SERPL CALC-SCNC: 4 MMOL/L (ref 7–16)
BUN SERPL-MCNC: 40 MG/DL (ref 7–18)
CALCIUM SERPL-MCNC: 8.5 MG/DL (ref 8.5–10.1)
CHLORIDE SERPL-SCNC: 97 MMOL/L (ref 98–107)
CO2 SERPL-SCNC: 35 MMOL/L (ref 21–32)
CREAT SERPL-MCNC: 1 MG/DL (ref 0.6–1.3)
GLUCOSE SERPL-MCNC: 186 MG/DL (ref 70–99)
HCT VFR BLD CALC: 27.3 % (ref 37–47)
HGB BLD-MCNC: 9 GM/DL (ref 12–15)
MCH RBC QN AUTO: 28.5 PG (ref 26–34)
MCHC RBC AUTO-ENTMCNC: 32.8 G/DL (ref 28–37)
MCV RBC: 86.9 FL (ref 80–100)
MPV: 8.6 FL. (ref 7.2–11.1)
PLATELET COUNT*: 159 THOU/UL (ref 150–400)
POTASSIUM SERPL-SCNC: 5.3 MMOL/L (ref 3.5–5.1)
RBC # BLD AUTO: 3.14 MIL/UL (ref 4.2–5)
RDW-CV: 13 % (ref 10.5–14.5)
SODIUM SERPL-SCNC: 136 MMOL/L (ref 136–145)
WBC # BLD AUTO: 9.7 THOU/UL (ref 4–11)

## 2021-08-20 NOTE — NUR
ASSUMED CARE AT 2120 WHEN PATIENT ADMITTED TO ROOM 326 PER W/C. UP WITH MOD
ASSIST, GAIT BELT, WALKER. WEARING PULLUP, THIS CHANGED AT HS. MOISTURE
BARRIER APPLIED AT HS. ASSESSMENT DONE. TAKES PILLS WHOLE WITH WATER. TURNS
SELF. NEEDS A LITTLE HELP TO GET LEGS IN BED. HAS A HISTORY OF POST HERPATIC
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 ON GABAPENTIN.
O2 5L/NC. HAS MULTIPLE AREAS OF BRUISING, SOME ON ABD. STATES BILAT TOES ARE
BRUISED BECAUSE SHE DROPPED THE WALKER ON THEM. FEET UP ON PILLOWS.  PLANS ON
USING BSC. HOURLY ROUNDS CONTINUE. BED ALARM ON. CALL LITE IN REACH.

## 2021-08-20 NOTE — NUR
ALERT AND ORIENTED X4.  UP WITH 1 ASSIST, GAIT BELT AND WALKER.  DIET CHANGED
TO CARB CONTROL DIET AND SLIDING SCALE INSULIN ADDED.  O2 INCREASED DUE TO
WHEN WORKING WITH THERAPY O2 SAT DROPPED.  RT AND DRS NOTIFIED.  RT INCREASED
O2 TO 10L/NC AND THEN SAID SHE WOULD TITRATE O2 DOWN AS PATIENT WAS
ABLE TO TOLERATE.  HAS NONPRODUCTIVE COUGH.  DENIED NEED FOR PAIN MEDICATION.
CONTINENT OF BOWEL AND BLADDER.  USES CALL LIGHT FOR ASSIST.  FALL PRECAUTIONS
IN PLACE, BED ALARM AND CHAIR ALARM USED.

## 2021-08-20 NOTE — NUR
Nutrition: Pt admitted to rehab with debility post COVID. H/o DM, COPD. Pt
stated her usual wt is 130#. Labs: -262, K+ 5.3, alb 2.7, prealb 17.2.
Pt and family stated she controls her BG with diet and exercise. Pt said BG
runs ~120 at home. She has had low BG without SX. We discussed TX for
hypoglycemia. On steroids. She is eating meals well. No other nutrition
interventions needed at this time. Low risk.

## 2021-08-20 NOTE — NUR
SLEPT MUCH OF THE NIGHT. VOIDED TWICE PER BSC. NO C/O PAIN. TURNS SELF.
HOURLY ROUNDS CONTINUE. BED ALARM ON. CALL LITE IN REACH.

## 2021-08-21 VITALS — DIASTOLIC BLOOD PRESSURE: 39 MMHG | SYSTOLIC BLOOD PRESSURE: 120 MMHG

## 2021-08-21 VITALS — SYSTOLIC BLOOD PRESSURE: 117 MMHG | DIASTOLIC BLOOD PRESSURE: 74 MMHG

## 2021-08-21 NOTE — NUR
PT UP WITH GAIT BELT AND WALKER X1 ASST. PT DID COMPLETE ALL THERAPY. PT IS
A&O X4. PT DOES HAVE DRY COUCH. PT REFUSED STOOL SOFTNER, DID BM X2. BARRIER
CREAM APPLIED.  PT IS ON 8L 02 AND 02 AT REST IS 95%. NO COMPLAINTS OF PAIN.
DAUGHTER AT BEDSIDE AND PT IS RESTING WITH CALL LIGHT IN REACH AND FALL
PRECAUTIONS IN PLACE.

## 2021-08-22 VITALS — SYSTOLIC BLOOD PRESSURE: 107 MMHG | DIASTOLIC BLOOD PRESSURE: 34 MMHG

## 2021-08-22 VITALS — SYSTOLIC BLOOD PRESSURE: 146 MMHG | DIASTOLIC BLOOD PRESSURE: 52 MMHG

## 2021-08-22 PROCEDURE — 5A0935A ASSISTANCE WITH RESPIRATORY VENTILATION, LESS THAN 24 CONSECUTIVE HOURS, HIGH NASAL FLOW/VELOCITY: ICD-10-PCS | Performed by: PHYSICAL MEDICINE & REHABILITATION

## 2021-08-22 NOTE — NUR
PT UP WITH GAIT BELT AND WALKER AND 1 X ASST. PT ON 8L 02 WITH O2 STAT 93 TO
95% . PT HAS WET COUGH AND IS STARTING TO COUGH UP [facility] SENT MSG TO DR. ROD
NEW ORDER FOR MUCINEX 600MG BID. PT IS RESTING IN ROOM WITH CALL LIGHT IN
REACH AND FALL PRECAUTIONS IN PLACE.

## 2021-08-22 NOTE — NUR
ASSUMED PT CARE AT 1930. ASSESSMENT COMPLETED AS CHARTED. ABLE TO MAKE NEEDS
KNOWN. UP WITH 1 ASSIST. NO C/O PAIN OR DISCOMFORT. RESTING IN BED ALL NIGHT.
WILL CONTINUE TO MONITOR.

## 2021-08-23 VITALS — SYSTOLIC BLOOD PRESSURE: 82 MMHG | DIASTOLIC BLOOD PRESSURE: 31 MMHG

## 2021-08-23 VITALS — SYSTOLIC BLOOD PRESSURE: 115 MMHG | DIASTOLIC BLOOD PRESSURE: 43 MMHG

## 2021-08-23 VITALS — SYSTOLIC BLOOD PRESSURE: 125 MMHG | DIASTOLIC BLOOD PRESSURE: 39 MMHG

## 2021-08-23 VITALS — DIASTOLIC BLOOD PRESSURE: 28 MMHG | SYSTOLIC BLOOD PRESSURE: 100 MMHG

## 2021-08-23 VITALS — DIASTOLIC BLOOD PRESSURE: 43 MMHG | SYSTOLIC BLOOD PRESSURE: 111 MMHG

## 2021-08-23 VITALS — DIASTOLIC BLOOD PRESSURE: 38 MMHG | SYSTOLIC BLOOD PRESSURE: 95 MMHG

## 2021-08-23 NOTE — NUR
ASSUMED PT CARE AT 1930. ASSESSMENT COMPLETED AS CHARTED. ABLE TO MAKE NEEDS
KNOWN. RESTING IN BED ALL NIGHT. REDUCED O2 BY 1L TONIGHT TO 7L. NO SOA. NO
PAIN OR DISCOMFORT. WILL CONTINUE TO MONITOR.

## 2021-08-23 NOTE — NUR
ALERT AND ORIENTED X4.  UP WITH 1 ASSIST, GAIT BELT AND WALKER.  DENIES NEED
FOR PAIN MEDICATION.  CONTNENT OF BOWEL AND BLADDER.  DR NOTIFIED OF LOW B/P
AND HIGH BLOOD SUGARS EARLIER TODAY.  DR HERE TO SEE PATIENT,NO NEW ORDERS.
CONTINUEING TO MONITOR PATIENT.  O2 INCREASED BACK UP TO 7L/NC TO KEEP O2 SATS
IN 90'S.  TAKES PILLS WITHOUT DIFFICULTY.  USES CALL LIGHT FOR ASSIST.  FALL
PRECAUTIONS IN PLACE.

## 2021-08-23 NOTE — NUR
1200 ACCUCHECK READING HI, CHECKED X2.  LAB ORDERED.  1205 REHAB DR HERE AND
NOTIFIED.  1208 V/S BP82/31 CHECKED X2 WHILE SITTING.  LAIDED DOWN IN RECLINER
/28.  PATIENT ALERT AND ORIENTED AND STATED FEELING OK.  O2 SAT MID
TO LOW 80'S ON O2 AT 5L/NC.  O2 INCREASED UP TO 7L/NC.  1215
LAB DRAWN.  NURSING SUPERVISOR NOTIFIED.  1225 MESSAGE SENT URGENT TO 
1230 INSULIN 20 UNITS GIVEN.  1243 SITTING B/P 111/43,  CALLED AND LEFT
STAT MESSAGE REGARDING CRITICAL LAB RESULTS OF BLOOD SUGAR 609.  O2 SAT
90-91% WITH 7L/NC,  1245 PATIENT ATE 50% OF LUNCH.  1310 ACCUCHECK RECHECK
STILL HI.  1325 MESSAGE AGAIN SENT URGENT TO DRWade  1355 DR RETURNED CALL WITH
NO NEW ORDERS OTHER THAN TO JUST WATCH PATIENT AT THIS TIME.  1450 DR HERE TO
SEE PATIENT, NO NEW ORDERS.

## 2021-08-24 VITALS — DIASTOLIC BLOOD PRESSURE: 58 MMHG | SYSTOLIC BLOOD PRESSURE: 148 MMHG

## 2021-08-24 VITALS — DIASTOLIC BLOOD PRESSURE: 40 MMHG | SYSTOLIC BLOOD PRESSURE: 103 MMHG

## 2021-08-24 PROCEDURE — 5A0935A ASSISTANCE WITH RESPIRATORY VENTILATION, LESS THAN 24 CONSECUTIVE HOURS, HIGH NASAL FLOW/VELOCITY: ICD-10-PCS | Performed by: PHYSICAL MEDICINE & REHABILITATION

## 2021-08-24 NOTE — NUR
PATIENT COMPLETED THERAPIES THIS SHIFT AS ORDERED. UP WITH ASSISTANCE, GAIT
BELT AND WALKER WITH 02. DR. VEGA NOTIFIED OF ELEVATED GLUCOSE TODAY. PER
ORDERS PATIENT TO HAVE INSULIN WITH MEALS AND NO INSULIN AT HS. PATIENT AWARE
OF NEW PLAN OF CARE. AM LABS AND TEAM MEETING TOMORROW.

## 2021-08-24 NOTE — NUR
CM COMPLETED INITIAL ASSESSMENT FOR REHAB UNIT. PT LIVES ALONE, AS OF EARLY
AUGUST, D/T HER SPOUSE "DYING WITH COVID ON AUG 2...AT [facility]."
PT DTRS CHECK UP ON HER FREQUENTLY. PT USES CANE. INDEPENDENT WITH ADLS. HAS
ONE STEP TO ENTER HOME AND NONE INSIDE OF HOME. PT HAS HX WITH [facility]. CM
EDU PT ON TEAM CONFERENCE AND CM ROLE. PT NOR DTRRAMON HAVE QUESTION FOR
TOMORROW'S TEAM CONFERENCE.

## 2021-08-24 NOTE — NUR
ASSUMED CARES AT 1920. ALERT AND ORIENTED. PLEASANT. DENIED ANY PAIN. O2 7L
NC. MOD ASSIST WITH GAIT BELT AND WALKER. UP TO BSC. SLEPT WELL. CALL LIGHT IN
REACH AND BED ALARM ON.

## 2021-08-25 VITALS — DIASTOLIC BLOOD PRESSURE: 55 MMHG | SYSTOLIC BLOOD PRESSURE: 141 MMHG

## 2021-08-25 VITALS — DIASTOLIC BLOOD PRESSURE: 38 MMHG | SYSTOLIC BLOOD PRESSURE: 105 MMHG

## 2021-08-25 VITALS — DIASTOLIC BLOOD PRESSURE: 44 MMHG | SYSTOLIC BLOOD PRESSURE: 120 MMHG

## 2021-08-25 LAB
ANION GAP SERPL CALC-SCNC: 2 MMOL/L (ref 7–16)
BUN SERPL-MCNC: 48 MG/DL (ref 7–18)
CALCIUM SERPL-MCNC: 8.4 MG/DL (ref 8.5–10.1)
CHLORIDE SERPL-SCNC: 101 MMOL/L (ref 98–107)
CO2 SERPL-SCNC: 34 MMOL/L (ref 21–32)
CREAT SERPL-MCNC: 0.9 MG/DL (ref 0.6–1.3)
GLUCOSE SERPL-MCNC: 32 MG/DL (ref 70–99)
HCT VFR BLD CALC: 26.1 % (ref 37–47)
HGB BLD-MCNC: 8.5 GM/DL (ref 12–15)
MCH RBC QN AUTO: 28.5 PG (ref 26–34)
MCHC RBC AUTO-ENTMCNC: 32.5 G/DL (ref 28–37)
MCV RBC: 87.8 FL (ref 80–100)
MPV: 8.7 FL. (ref 7.2–11.1)
PLATELET COUNT*: 157 THOU/UL (ref 150–400)
POTASSIUM SERPL-SCNC: 4.5 MMOL/L (ref 3.5–5.1)
RBC # BLD AUTO: 2.97 MIL/UL (ref 4.2–5)
RDW-CV: 13.1 % (ref 10.5–14.5)
SODIUM SERPL-SCNC: 137 MMOL/L (ref 136–145)
WBC # BLD AUTO: 12 THOU/UL (ref 4–11)

## 2021-08-25 PROCEDURE — 5A0935A ASSISTANCE WITH RESPIRATORY VENTILATION, LESS THAN 24 CONSECUTIVE HOURS, HIGH NASAL FLOW/VELOCITY: ICD-10-PCS | Performed by: PHYSICAL MEDICINE & REHABILITATION

## 2021-08-25 NOTE — NUR
ASSUMED CARES AT 1920. ALERT AND ORIENTED. PLEASANT. DENIED ANY PAIN. O2 7L
NC. MIN ASSIST WITH GAIT BELT AND WALKER. UP TO BSC. ACCUCHECK AT . PT'S
BEDTIME INSULIN HAD BEEN D/C'D EARLIER IN THE DAY.  PAGED DR BARCENAS, ORDER
FOR MODERATE S/S INSULIN ACHS. LISPRO 20 UNITS GIVEN PER S/S. PT HAD HS SNACK
AS WELL. THIS AM, RECEIVED CALL OF CRITICAL GLUCOSE LAB 32. TWO JUICES GIVEN.
PAGED DR BARCENAS WITH CRITICAL RESULTS. RECHECK BLOOD SUGAR . PT
FEELING BETTER. PT RESTING QUIETLY AT THIS TIME. CALL LIGHT IN REACH AND BED
ALARM ON.

## 2021-08-25 NOTE — NUR
PATIENT COMPLETED THERPIES THIS SHIFT AS ORDERED. UP WITH ASSISTANCE, GAIT
BELT AND WALKER. PATIENT ON 8L 02 WITH PORTABLE TANK, DESATS TO 88% AND TAKES
A FEW MINUTES TO RECOVER WITH ACTIVITY. DR. VEGA NOTIFIED AND BREATHING
TREATMENTS ORDERED. INSULIN DECREASED TO LDSS AND METFORMIN ADDED TO SCHED
MEDS BID. VOIDING PER BSC, NO BM NOTED THIS SHIFT. WOUND NURSE NOTIFIED OF
REDNESS TO COCCYX AT TOP OF BUTTOCKS CRACK, OINTMENT HAS BEEN APPLIED AND
LOOKS IMPROVED TODAY. TEAM MEETING TODAY, PATIENT RETEAM.

## 2021-08-25 NOTE — NUR
Case and plan of care reviewed with MD each weekday during patient's length of
stay. Continue plan of care per MD orders for current dx. Pt will be reteamed
in one week for continued rehab needs.
 
CM will continue to follow for discharge planning needs.

## 2021-08-26 VITALS — DIASTOLIC BLOOD PRESSURE: 64 MMHG | SYSTOLIC BLOOD PRESSURE: 111 MMHG

## 2021-08-26 VITALS — DIASTOLIC BLOOD PRESSURE: 42 MMHG | SYSTOLIC BLOOD PRESSURE: 118 MMHG

## 2021-08-26 PROCEDURE — 5A0935A ASSISTANCE WITH RESPIRATORY VENTILATION, LESS THAN 24 CONSECUTIVE HOURS, HIGH NASAL FLOW/VELOCITY: ICD-10-PCS | Performed by: PHYSICAL MEDICINE & REHABILITATION

## 2021-08-26 NOTE — NUR
PT IS UP WITH GAIT BELT AND WALKER . PT COULD NOT COMPLETE THERAPY TODAY ,
PT'S 02 LEVEL KEPT DROPPING. PT BACK UP TO 8L 02 STATING AT 91%. NOTIFIED DR DENNIS WHO ORDERED A CHEST XRAY AND CT.A 20 TY IV WAS PLACED IN RT AC, AND
RT WRIST. CONSULT WAS CALLED TO DR RHODES, SPOKE TO CANDY. PER DR DENNIS DC
METFORMIN DUE TO FAMILY AND PATIENT REQUEST.  PT IS RESTING WITH CALL LIGHT IN
REACH AND FALL PRECAUTIONS IN PLACE.

## 2021-08-27 VITALS — DIASTOLIC BLOOD PRESSURE: 51 MMHG | SYSTOLIC BLOOD PRESSURE: 120 MMHG

## 2021-08-27 VITALS — SYSTOLIC BLOOD PRESSURE: 156 MMHG | DIASTOLIC BLOOD PRESSURE: 56 MMHG

## 2021-08-27 LAB
ABSOLUTE MONOCYTES: 0.2 THOU/UL (ref 0–1.2)
ALBUMIN SERPL-MCNC: 2.8 G/DL (ref 3.4–5)
ALP SERPL-CCNC: 127 U/L (ref 46–116)
ALT SERPL-CCNC: 25 U/L (ref 30–65)
ANION GAP SERPL CALC-SCNC: 4 MMOL/L (ref 7–16)
ANISOCYTOSIS BLD QL SMEAR: (no result)
AST SERPL-CCNC: 16 U/L (ref 15–37)
BILIRUB SERPL-MCNC: 0.3 MG/DL
BUN SERPL-MCNC: 55 MG/DL (ref 7–18)
CALCIUM SERPL-MCNC: 8.5 MG/DL (ref 8.5–10.1)
CHLORIDE SERPL-SCNC: 101 MMOL/L (ref 98–107)
CO2 SERPL-SCNC: 32 MMOL/L (ref 21–32)
CREAT SERPL-MCNC: 1 MG/DL (ref 0.6–1.3)
GLUCOSE SERPL-MCNC: 167 MG/DL (ref 70–99)
GRANULOCYTES NFR BLD MANUAL: 96 %
HCT VFR BLD CALC: 27.1 % (ref 37–47)
HGB BLD-MCNC: 8.8 GM/DL (ref 12–15)
LYMPHOCYTES # BLD: 0.2 THOU/UL (ref 0.8–5.3)
LYMPHOCYTES NFR BLD AUTO: 2 %
MAGNESIUM SERPL-MCNC: 1.9 MG/DL (ref 1.8–2.4)
MCH RBC QN AUTO: 28.6 PG (ref 26–34)
MCHC RBC AUTO-ENTMCNC: 32.5 G/DL (ref 28–37)
MCV RBC: 87.9 FL (ref 80–100)
MONOCYTES NFR BLD: 2 %
MPV: 8.5 FL. (ref 7.2–11.1)
NEUTROPHILS # BLD: 11.3 THOU/UL (ref 1.6–8.1)
NUCLEATED RBCS: 0 /100WBC
PLATELET # BLD EST: ADEQUATE 10*3/UL
PLATELET COUNT*: 183 THOU/UL (ref 150–400)
POIKILOCYTOSIS BLD QL SMEAR: (no result)
POTASSIUM SERPL-SCNC: 6 MMOL/L (ref 3.5–5.1)
PROT SERPL-MCNC: 6 G/DL (ref 6.4–8.2)
RBC # BLD AUTO: 3.08 MIL/UL (ref 4.2–5)
RDW-CV: 13.7 % (ref 10.5–14.5)
SODIUM SERPL-SCNC: 137 MMOL/L (ref 136–145)
WBC # BLD AUTO: 11.8 THOU/UL (ref 4–11)

## 2021-08-27 PROCEDURE — 5A0935A ASSISTANCE WITH RESPIRATORY VENTILATION, LESS THAN 24 CONSECUTIVE HOURS, HIGH NASAL FLOW/VELOCITY: ICD-10-PCS | Performed by: PHYSICAL MEDICINE & REHABILITATION

## 2021-08-27 NOTE — EKG
[place] [address]
Phone:  [phone]                     ELECTROCARDIOGRAM REPORT      
_______________________________________________________________________________
 
Name:         FRANDY DIAZ           Room:          [address]    ADM IN 
.R.#:    [id number]     Account #:     [id number]  
Admission:    21    Attend Phys:   Mohsen Tahani, MD 
Discharge:                Date of Birth: [birth year]  
Date of Service: 21  Report #:      [id number]
        [id number]
_______________________________________________________________________________
THIS REPORT FOR:  //name//                      
 
                          [facility]
                                       
Test Date:    2021               Test Time:    09:27:51
Pat Name:     FRANDY DIAZ               Department:   
Patient ID:   [id number]            Room:         [address]
Gender:       F                        Technician:   
:          [id number]               Requested By: Mohsen Tahani
Order Number: [id number]    Reading MD:   Carlton Dominguez
                                 Measurements
Intervals                              Axis          
Rate:         98                       P:            0
ND:                                    QRS:          258
QRSD:         129                      T:            25
QT:           384                                    
QTc:          491                                    
                           Interpretive Statements
Atrial-sensed ventricular-paced complexes with fusion noted
No further rhythm analysis attempted due to paced rhythm
Nonspecific IVCD with LAD
Nonspecific T abnrm, anterolateral leads
Baseline wander in lead(s) V2
Compared to ECG 2021 08:05:39
IVCD is less pronounced
Electronically Signed On 2021 9:47:57 CDT by Carlton Dominguez
[email or web address]
 
 
 
 
 
 
 
 
 
 
 
 
 
 
 
 
 
  <ELECTRONICALLY SIGNED>
                                           By: Carlton Dominguez MD, [facility]     
  21
D: 21   _____________________________________
T: 21   Carlton Dominguez MD, [facility]       /EPI

## 2021-08-27 NOTE — NUR
PT UP WITH GAIT BELT AND WALKER. PT DO NOT COMPLETE ANY THERAPY TODAY. PT WAS
SOA AND 02 WAS 92 ON 8L, ON GETTING UP 02 DROPS TO 87/88 EVEN 82% . PT
POTASSIUM WAS 6.0 AT 0400. DR DENNIS WAS NOTIFIED, EKG WAS DONE, AND LAB RE
DRAW FOR POTASSIUM WHICH WAS 4.8 AT 10:00AM . THERAPY WAS HELD TODAY AND WILL
START AGAIN ON LOW THERAPY TOMM. PT HAS NO COMPLAINTS OF PAIN.AND IS RESTING
WITH CALL LIGHT IN REACH AND FALL PRECAUTIONS IN PLACE,

## 2021-08-28 VITALS — DIASTOLIC BLOOD PRESSURE: 36 MMHG | SYSTOLIC BLOOD PRESSURE: 115 MMHG

## 2021-08-28 VITALS — DIASTOLIC BLOOD PRESSURE: 44 MMHG | SYSTOLIC BLOOD PRESSURE: 100 MMHG

## 2021-08-28 LAB
ANION GAP SERPL CALC-SCNC: 5 MMOL/L (ref 7–16)
BUN SERPL-MCNC: 61 MG/DL (ref 7–18)
CALCIUM SERPL-MCNC: 8.3 MG/DL (ref 8.5–10.1)
CHLORIDE SERPL-SCNC: 101 MMOL/L (ref 98–107)
CO2 SERPL-SCNC: 31 MMOL/L (ref 21–32)
CREAT SERPL-MCNC: 1 MG/DL (ref 0.6–1.3)
GLUCOSE SERPL-MCNC: 161 MG/DL (ref 70–99)
POTASSIUM SERPL-SCNC: 5.5 MMOL/L (ref 3.5–5.1)
SODIUM SERPL-SCNC: 137 MMOL/L (ref 136–145)

## 2021-08-28 NOTE — NUR
ALERT AND ORIENTED X4.  UP WITH 1 ASSIST,GAIT BELT, O2 AND WALKER.  O2
INCREASED TO 10L/NC WHEN UP WITH THERAPIES TO KEEP O2 SATS MID 80'S OR ABOVE.
WHEN AT REST O2 SAT DECREASED BACK DOWN TO 8L/NC AND O2 SAT RECHECK WAS 94%/NC
DENIES PAIN.  USES CALL LIGHT FOR ASSIST.  FALL PRECAUTIONS IN PLACE, BED
ALARM AND CHAIR ALARM USED.

## 2021-08-28 NOTE — NUR
Pt spent first part of shift sitting up in chair watching tv. Slept through
the night. Still dyspneic with exertion, but did not complain of SOB. Did not
ask for PRN meds.

## 2021-08-29 VITALS — SYSTOLIC BLOOD PRESSURE: 124 MMHG | DIASTOLIC BLOOD PRESSURE: 66 MMHG

## 2021-08-29 VITALS — SYSTOLIC BLOOD PRESSURE: 128 MMHG | DIASTOLIC BLOOD PRESSURE: 62 MMHG

## 2021-08-29 LAB
ABSOLUTE BASOPHILS: 0 THOU/UL (ref 0–0.2)
ABSOLUTE EOSINOPHILS: 0 THOU/UL (ref 0–0.7)
ABSOLUTE MONOCYTES: 0.4 THOU/UL (ref 0–1.2)
ALBUMIN SERPL-MCNC: 2.6 G/DL (ref 3.4–5)
ALP SERPL-CCNC: 123 U/L (ref 46–116)
ALT SERPL-CCNC: 23 U/L (ref 30–65)
ANION GAP SERPL CALC-SCNC: 4 MMOL/L (ref 7–16)
AST SERPL-CCNC: 16 U/L (ref 15–37)
BASOPHILS NFR BLD AUTO: 0.1 %
BILIRUB SERPL-MCNC: 0.3 MG/DL
BUN SERPL-MCNC: 53 MG/DL (ref 7–18)
CALCIUM SERPL-MCNC: 8.6 MG/DL (ref 8.5–10.1)
CHLORIDE SERPL-SCNC: 101 MMOL/L (ref 98–107)
CO2 SERPL-SCNC: 32 MMOL/L (ref 21–32)
CREAT SERPL-MCNC: 0.9 MG/DL (ref 0.6–1.3)
EOSINOPHIL NFR BLD: 0 %
GLUCOSE SERPL-MCNC: 211 MG/DL (ref 70–99)
GRANULOCYTES NFR BLD MANUAL: 94.7 %
HCT VFR BLD CALC: 25.6 % (ref 37–47)
HGB BLD-MCNC: 8.4 GM/DL (ref 12–15)
LYMPHOCYTES # BLD: 0.2 THOU/UL (ref 0.8–5.3)
LYMPHOCYTES NFR BLD AUTO: 1.8 %
MAGNESIUM SERPL-MCNC: 1.9 MG/DL (ref 1.8–2.4)
MCH RBC QN AUTO: 28.8 PG (ref 26–34)
MCHC RBC AUTO-ENTMCNC: 32.9 G/DL (ref 28–37)
MCV RBC: 87.4 FL (ref 80–100)
MONOCYTES NFR BLD: 3.4 %
MPV: 8.5 FL. (ref 7.2–11.1)
NEUTROPHILS # BLD: 10.7 THOU/UL (ref 1.6–8.1)
NUCLEATED RBCS: 0 /100WBC
PHOSPHATE SERPL-MCNC: 4.2 MG/DL (ref 2.5–4.9)
PLATELET COUNT*: 203 THOU/UL (ref 150–400)
POTASSIUM SERPL-SCNC: 5.4 MMOL/L (ref 3.5–5.1)
PROT SERPL-MCNC: 5.8 G/DL (ref 6.4–8.2)
RBC # BLD AUTO: 2.93 MIL/UL (ref 4.2–5)
RDW-CV: 13.9 % (ref 10.5–14.5)
SODIUM SERPL-SCNC: 137 MMOL/L (ref 136–145)
WBC # BLD AUTO: 11.3 THOU/UL (ref 4–11)

## 2021-08-29 NOTE — NUR
ALERT AND ORIENTED X4.  UP WITH STAND BY ASSIST, GAIT BELT AND WALKER TO
BEDSIDE COMMODE OR RECLINER.  GETS SOA WITH ANY ACTIVITY.  O2 INCREASED FROM
8L/HFC TO 10L/HFC WITH ACTIVITIES TO KEEP O2 SATS IN 80'S.  DR NOTIFIED OF
POTASSIUM LEVEL FOR LAST FEW DAYS AND NEW ORDER NOTED.  TAKES PILLS WITHOUT
DIFFICULTY.  USES CALL LIGHT FOR ASSIST.  FALL PRECAUTIONS IN PLACE.

## 2021-08-29 NOTE — NUR
PATIENT IN RECLINER AT BEGINNING OF SHIFT.  PT UP TO BSC WITH USE OF WALKER
AND G.BELT.  PT DID VERY WELL.  PT ABLE TO REMOVE AND PULL UP PANTS WITH VERY
MINIMAL ASSIST.  PT AMBULATED TO BED.  PT ON O2 @ 8LITERS PER HIGH FLOW
CANNULA.  O2 INCREASED TO 10LITERS WITH AMBULATION.  PT DENIES NEEDS AT THIS
TIME.  FREQUENTLY USED ITEMS AND CALL LIGHT WITHIN REACH.  SIDERAILS UPX3 AND
BED ALARM ON.  WILL CONTINUE TO MONITOR.

## 2021-08-30 VITALS — DIASTOLIC BLOOD PRESSURE: 46 MMHG | SYSTOLIC BLOOD PRESSURE: 148 MMHG

## 2021-08-30 VITALS — DIASTOLIC BLOOD PRESSURE: 41 MMHG | SYSTOLIC BLOOD PRESSURE: 130 MMHG

## 2021-08-30 LAB
ANION GAP SERPL CALC-SCNC: 3 MMOL/L (ref 7–16)
BUN SERPL-MCNC: 50 MG/DL (ref 7–18)
CALCIUM SERPL-MCNC: 8.1 MG/DL (ref 8.5–10.1)
CHLORIDE SERPL-SCNC: 104 MMOL/L (ref 98–107)
CO2 SERPL-SCNC: 33 MMOL/L (ref 21–32)
CREAT SERPL-MCNC: 0.9 MG/DL (ref 0.6–1.3)
GLUCOSE SERPL-MCNC: 193 MG/DL (ref 70–99)
POTASSIUM SERPL-SCNC: 4.5 MMOL/L (ref 3.5–5.1)
SODIUM SERPL-SCNC: 140 MMOL/L (ref 136–145)

## 2021-08-30 PROCEDURE — 5A0935A ASSISTANCE WITH RESPIRATORY VENTILATION, LESS THAN 24 CONSECUTIVE HOURS, HIGH NASAL FLOW/VELOCITY: ICD-10-PCS | Performed by: PHYSICAL MEDICINE & REHABILITATION

## 2021-08-30 NOTE — NUR
ALERT AND ORIENTED X4.  UP WITH STAND UP ASSIST, GAIT BELT AND WALKER.  O2
STARTED AT 7L/HFC AND INCREASED UP TO 10L/HFC WITH ANY ACTIVITY.  HAS
BILATERAL LOWER EXTREMITY EDEMA.  DR NOTIFIED AND DOPPLER DONE AS ORDERED AND
NORMAL.  LUNG SOUNDS REMAIN COARSE WITH SOME CRACKLES.  USES CALL LIGHT FOR
ASSIST.  FALL PRECAUTIONS IN PLACE.  TAKES PILLS WITHOUT DIFFICULTY.

## 2021-08-30 NOTE — NUR
PATIENT SAT IN RECLINER UNTIL 2230 THEN WENT TO BED. PT UP WITH G.BELT AND
WALKER.  PT DOES VERY WELL AMBULATING.  PT ABLE TO REPOSITION HERSELF IN BED.
PT HAD TWO LARGE BOWEL MOVEMENTS DURING THIS SHIFT.  PT ON HIGH FLOW @ 7LITERS
WITH SATS AT 95% THIS MORNING.  PT DENIES NEEDS AT THIS TIME.  FREQUENTLY USED
ITEMS AND CALL LIGHT WITHIN REACH.  SIDERAILS UPX3 AND BED ALARM ON.  WILL
CONTINUE TO MONITOR.

## 2021-08-31 VITALS — DIASTOLIC BLOOD PRESSURE: 48 MMHG | SYSTOLIC BLOOD PRESSURE: 140 MMHG

## 2021-08-31 VITALS — SYSTOLIC BLOOD PRESSURE: 136 MMHG | DIASTOLIC BLOOD PRESSURE: 43 MMHG

## 2021-08-31 LAB
ANION GAP SERPL CALC-SCNC: 3 MMOL/L (ref 7–16)
BUN SERPL-MCNC: 52 MG/DL (ref 7–18)
CALCIUM SERPL-MCNC: 8.1 MG/DL (ref 8.5–10.1)
CHLORIDE SERPL-SCNC: 106 MMOL/L (ref 98–107)
CO2 SERPL-SCNC: 33 MMOL/L (ref 21–32)
CREAT SERPL-MCNC: 0.8 MG/DL (ref 0.6–1.3)
GLUCOSE SERPL-MCNC: 128 MG/DL (ref 70–99)
MAGNESIUM SERPL-MCNC: 2.2 MG/DL (ref 1.8–2.4)
POTASSIUM SERPL-SCNC: 4.5 MMOL/L (ref 3.5–5.1)
SODIUM SERPL-SCNC: 142 MMOL/L (ref 136–145)

## 2021-08-31 PROCEDURE — 5A0935A ASSISTANCE WITH RESPIRATORY VENTILATION, LESS THAN 24 CONSECUTIVE HOURS, HIGH NASAL FLOW/VELOCITY: ICD-10-PCS | Performed by: PHYSICAL MEDICINE & REHABILITATION

## 2021-08-31 NOTE — NUR
ASSUMED PT CARE AT 1930. PT ALERT AND ORIENTED X4, POLITE AND COOPERATIVE WITH
CARES.  PT SITTING UP IN RECLINER AT SHIFT CHANGE, TRANSFERS WITH MIN ASSIST,
GAIT BELT AND WALKER.  UP TO BSC TO VOID.  NO STOOL THIS SHIFT.  ON 9L/HFC UP
TO 10L/HFC WITH ACTIVITY.  BLE EDEMA. TURNS SELF IN BED.  TAKES PILLS WHOLE
WITHOUT DIFFICULTY.  DENIES PAIN.  CALL LIGHT IN REACH, BED ALARM ON FOR
SAFETY.  HOURLY ROUNDING IN PROGRESS, WILL CONTINUE TO MONITOR.

## 2021-08-31 NOTE — NUR
PATIENT COMPLETED THERAPIES THIS SHIFT AS ORDERED. UP WITH ASSISTANCE OF GAIT
BELT AND WALKER/02. PATIENT DESATS FROM 10L HF DURING THERAPY, PATIENT ABLE TO
RECOVER OVER 1-2 MINUTES. PATIENT UTILZIING BSC FIRST PART OF SHIFT, PATIENT
DID AMBULATE TO BATHROOM THIS AFTERNOON. PATIENT NOTED TO BE MORE SOA AFTER
AMBULATING TO BATHROOM. 02 SAT ON 10L HF WAS 75% AT THE LOWEST AND MID 80'S AT
THE HIGHEST. RT HERE FOR TREATMENT AND INCREASED 02 NEEDS TO 15L HF. PATIENT
SATTING 91-92%. DR. DEL ANGEL PAGED AT THAT TIME BUT NO RESPONSE. DR. VEGA
WAS NOTIFIED AND ORDERS FOR CXR. CXR RESULTS SENT VIA YOU CALL TO DR. VEGA
AND NO NEW ORDERS RECEIVED. DR. KEITA HERE THIS EVENING AND NOTIFIED OF 02
NEEDS AND CXR RESULTS, STATED WILL PUT ORDERS IN. BM NOTED THIS SHIFT.INSULIN
GIVEN WITH MEALS WHEN REQUIRED. AM LABS ORDERED AND TEAM MEETING TOMORROW.

## 2021-09-01 VITALS — SYSTOLIC BLOOD PRESSURE: 151 MMHG | DIASTOLIC BLOOD PRESSURE: 58 MMHG

## 2021-09-01 VITALS — DIASTOLIC BLOOD PRESSURE: 54 MMHG | SYSTOLIC BLOOD PRESSURE: 128 MMHG

## 2021-09-01 LAB
ABSOLUTE MONOCYTES: 0.2 THOU/UL (ref 0–1.2)
ALBUMIN SERPL-MCNC: 2.3 G/DL (ref 3.4–5)
ALP SERPL-CCNC: 126 U/L (ref 46–116)
ALT SERPL-CCNC: 20 U/L (ref 30–65)
ANION GAP SERPL CALC-SCNC: 1 MMOL/L (ref 7–16)
ANISOCYTOSIS BLD QL SMEAR: (no result)
AST SERPL-CCNC: 13 U/L (ref 15–37)
BILIRUB SERPL-MCNC: 0.3 MG/DL
BUN SERPL-MCNC: 47 MG/DL (ref 7–18)
CALCIUM SERPL-MCNC: 8.1 MG/DL (ref 8.5–10.1)
CHLORIDE SERPL-SCNC: 107 MMOL/L (ref 98–107)
CO2 SERPL-SCNC: 35 MMOL/L (ref 21–32)
CREAT SERPL-MCNC: 0.8 MG/DL (ref 0.6–1.3)
GLUCOSE SERPL-MCNC: 183 MG/DL (ref 70–99)
GRANULOCYTES NFR BLD MANUAL: 93 %
HCT VFR BLD CALC: 23.6 % (ref 37–47)
HGB BLD-MCNC: 7.7 GM/DL (ref 12–15)
LYMPHOCYTES # BLD: 0.4 THOU/UL (ref 0.8–5.3)
LYMPHOCYTES NFR BLD AUTO: 5 %
MAGNESIUM SERPL-MCNC: 2 MG/DL (ref 1.8–2.4)
MCH RBC QN AUTO: 29 PG (ref 26–34)
MCHC RBC AUTO-ENTMCNC: 32.9 G/DL (ref 28–37)
MCV RBC: 88.3 FL (ref 80–100)
MONOCYTES NFR BLD: 2 %
MPV: 8.5 FL. (ref 7.2–11.1)
NEUTROPHILS # BLD: 8.1 THOU/UL (ref 1.6–8.1)
NUCLEATED RBCS: 0 /100WBC
PLATELET # BLD EST: ADEQUATE 10*3/UL
PLATELET COUNT*: 211 THOU/UL (ref 150–400)
POIKILOCYTOSIS BLD QL SMEAR: (no result)
POTASSIUM SERPL-SCNC: 4.6 MMOL/L (ref 3.5–5.1)
PROT SERPL-MCNC: 5.5 G/DL (ref 6.4–8.2)
RBC # BLD AUTO: 2.67 MIL/UL (ref 4.2–5)
RDW-CV: 14.5 % (ref 10.5–14.5)
SODIUM SERPL-SCNC: 143 MMOL/L (ref 136–145)
WBC # BLD AUTO: 8.7 THOU/UL (ref 4–11)

## 2021-09-01 PROCEDURE — 5A0935A ASSISTANCE WITH RESPIRATORY VENTILATION, LESS THAN 24 CONSECUTIVE HOURS, HIGH NASAL FLOW/VELOCITY: ICD-10-PCS | Performed by: PHYSICAL MEDICINE & REHABILITATION

## 2021-09-01 NOTE — NUR
Case and plan of care reviewed with MD each week during patient's length of
stay. Continue plan of care per MD orders for current dx. Length of endurance
decreased due to desats with activity. Guidelines from  given to team.
 
Spoke to pt prior to team meeting, she voiced no concerns or questions.
Called and spoke to Dtr Lissy, who is very concerned and anxious about mom.
She shared not getting the communication from team that she feels is needed.
After allowing her to vent frustrations since admissions came up with what she
feels is needed. She would like to have face to face meetings with Dr Wilson,
stated she is seeing pulmonary and hospitalist intermittently when they round
on weekends. She would like Rehab nurse to call her with updates and requested
these calls happend Thursday or Friday. CM will continue to update weekly and
would take these request to team meeting. Dr Wilson stated she can see him
each week following team meeting. Other days cannot quarantee exact time.
Nurses shared dtr can call anytime she has questions or concerns.
 
Will call daughter tomorrow and update her on communication plan.

## 2021-09-01 NOTE — NUR
ASSUMED PT CARE AT 1930.  PT ALERT AND ORIENTED X4, POLITE AND COOPERATIVE
WITH CARES.  PT SITTING UP IN RECLINER AT SHIFT CHANGE, PREFERS TO STAY UP
LATE.  ON 15L HFC, SATS 94%  PT UP TO BSC TO VOID SEVERAL TIMES WITH MIN
ASSIST, GAIT BELT AND WALKER.  BLOOD SUGAR 405 AT 2100, 20 UNITS OF HUMALOG
GIVEN PER MAR.  DR. BARCENAS ADVISED, NO NEW ORDERS. BEDTIME SNACK PROVIDED.
PT SLEPT WELL OVERNIGHT.  NO C/O PAIN.  CALL LIGHT IN REACH, BED ALARM ON FOR
SAFETY.  HOURLY ROUNDING IN PROGRESS, WILL CONTINUE TO MONITOR.

## 2021-09-01 NOTE — NUR
PT UP WITH GAIT BELT AND WALKER. PT DID COMPLETE THERAPY. PT ON 15L 02.
02 STAT WAS 99% LAYING DOWN THIS AM, FALLS TO 90% WHEN SITTING UP. PT
HAS NO COMPLAINTS OF PAIN. PT DOES HAVE +2 EDEMA IN BOTH ANKLES. PT HAS IV IN
RT AC. PT ALERT X4.  DR MARCIAL DID ADD INSULIN OF 4 UNITS TO MEALS. DR MARCIAL
ALSO WANTED PT TO TAKE METFORMIN ER. PT AND DAUGHTER REFUSED. DAUGHTER IS AT
BEDSIDE AND PT IS RESTING WITH CALL LIGHT AND FALL PRECAUTIONS IN PLACE.

## 2021-09-02 VITALS — SYSTOLIC BLOOD PRESSURE: 133 MMHG | DIASTOLIC BLOOD PRESSURE: 41 MMHG

## 2021-09-02 VITALS — SYSTOLIC BLOOD PRESSURE: 121 MMHG | DIASTOLIC BLOOD PRESSURE: 36 MMHG

## 2021-09-02 LAB
ABSOLUTE BASOPHILS: 0 THOU/UL (ref 0–0.2)
ABSOLUTE EOSINOPHILS: 0 THOU/UL (ref 0–0.7)
ABSOLUTE MONOCYTES: 0.5 THOU/UL (ref 0–1.2)
ALBUMIN SERPL-MCNC: 2.2 G/DL (ref 3.4–5)
ALP SERPL-CCNC: 119 U/L (ref 46–116)
ALT SERPL-CCNC: 22 U/L (ref 30–65)
ANION GAP SERPL CALC-SCNC: < 0 MMOL/L (ref 7–16)
AST SERPL-CCNC: 14 U/L (ref 15–37)
BASOPHILS NFR BLD AUTO: 0.3 %
BILIRUB SERPL-MCNC: 0.3 MG/DL
BUN SERPL-MCNC: 43 MG/DL (ref 7–18)
CALCIUM SERPL-MCNC: 8.3 MG/DL (ref 8.5–10.1)
CHLORIDE SERPL-SCNC: 105 MMOL/L (ref 98–107)
CO2 SERPL-SCNC: 39 MMOL/L (ref 21–32)
CREAT SERPL-MCNC: 0.7 MG/DL (ref 0.6–1.3)
EOSINOPHIL NFR BLD: 0.1 %
GLUCOSE SERPL-MCNC: 99 MG/DL (ref 70–99)
GRANULOCYTES NFR BLD MANUAL: 91.3 %
HCT VFR BLD CALC: 25.4 % (ref 37–47)
HGB BLD-MCNC: 8.2 GM/DL (ref 12–15)
LYMPHOCYTES # BLD: 0.2 THOU/UL (ref 0.8–5.3)
LYMPHOCYTES NFR BLD AUTO: 2.6 %
MAGNESIUM SERPL-MCNC: 2 MG/DL (ref 1.8–2.4)
MCH RBC QN AUTO: 28.6 PG (ref 26–34)
MCHC RBC AUTO-ENTMCNC: 32.3 G/DL (ref 28–37)
MCV RBC: 88.6 FL (ref 80–100)
MONOCYTES NFR BLD: 5.7 %
MPV: 8.7 FL. (ref 7.2–11.1)
NEUTROPHILS # BLD: 8.5 THOU/UL (ref 1.6–8.1)
NUCLEATED RBCS: 0 /100WBC
PLATELET COUNT*: 217 THOU/UL (ref 150–400)
POTASSIUM SERPL-SCNC: 4.8 MMOL/L (ref 3.5–5.1)
PROT SERPL-MCNC: 5.6 G/DL (ref 6.4–8.2)
RBC # BLD AUTO: 2.86 MIL/UL (ref 4.2–5)
RDW-CV: 14.5 % (ref 10.5–14.5)
SODIUM SERPL-SCNC: 143 MMOL/L (ref 136–145)
WBC # BLD AUTO: 9.4 THOU/UL (ref 4–11)

## 2021-09-02 PROCEDURE — 5A0935A ASSISTANCE WITH RESPIRATORY VENTILATION, LESS THAN 24 CONSECUTIVE HOURS, HIGH NASAL FLOW/VELOCITY: ICD-10-PCS | Performed by: PHYSICAL MEDICINE & REHABILITATION

## 2021-09-02 NOTE — NUR
9:05 Called carlosGisselle
today as requested by her at work # [phone] and left VM.
9:15 Called CarlosGisselle on her Cell [phone], and spoke to her, she was off
work today. Shared update on her requests for face to face meetings with Dr Cedillo. Dr Cedillo recommended Wednesday,after team meetings will meet with her
and pt after team. Notified Dtr team meeting starts at 11:30 and over last two
weeks between 12:30 and 12:45. Floor nurses asked that she call them for
updates, they are available 24/7, as they don't routinely call weekly updates.
Dtr asked "do you know who I am" in a very demanding tone, Cm claried
 yes I did. She verbalized she was insulted being told this.
 Discussion continued another 25 min and final outcome was that she didn't
want to wait until next Wednesday.
 CM suggested I could notify physicians today that
she wanted to speak with them directly and futher asked that they call Friday.
 
Dtr stated needed to speak with Pulm about increasing O2 need. Dr Wilson about
ongoing course of care, and Hospitalist about ordering Metformin when she
clear notified Rehab RN pt can't take due to GI issues it causes. Dtr further
stated that she spoke to RN yesterday and RN  had reported she reminded  of
Dtr wishes and  felt extended release version would not cause these issues
and wanted to trail it. Dtr verbalized very unhappy about this.
 
Updated Eve/[facility] of conversation and requests by Dtr.
 
Each Dr notified via text directly to them with Dtr phone numbers and of her
 request to speak with them.

## 2021-09-02 NOTE — NUR
ASSUMED PT CARE AT 1930.  PT ALERT AND ORIENTED X4, POLITE AND COOPERATIVE
WITH CARES.  SITTING UP IN RECLINER AT SHIFT CHANGE, LIKES TO STAY UP UNTIL
AFTER THE NEWS. TRANSFERS WITH MIN ASSIST, GAIT BELT AND WALKER. UP TO BSC TO
VOID.  NO STOOL THIS SHIFT.  ON 15L HFC.  BLE EDEMA.  TURNS SELF IN BED.
TAKES PILLS WHOLE WITHOUT DIFFICULTY.  DENIES PAIN.  CALL LIGHT IN REACH, BED
ALARM ON FOR SAFETY.  HOURLY ROUNDING IN PROGRESS, WILL CONTINUE TO MONITOR.

## 2021-09-02 NOTE — NUR
PT UP WITH GAIT BELT AND WALKER. PT DID COMPLETE ALL THERAPY. PT NOW ON 12 L
02 AT 96%. PT HAS NO COMPLAINTS OF PAIN. PT RESTING WITH FEET UP AND CALL
LIGHT IN REACH AND FALL PRECAUTIONS IN PLACE.

## 2021-09-03 VITALS — SYSTOLIC BLOOD PRESSURE: 138 MMHG | DIASTOLIC BLOOD PRESSURE: 51 MMHG

## 2021-09-03 VITALS — DIASTOLIC BLOOD PRESSURE: 52 MMHG | SYSTOLIC BLOOD PRESSURE: 120 MMHG

## 2021-09-03 LAB
ANION GAP SERPL CALC-SCNC: 2 MMOL/L (ref 7–16)
BUN SERPL-MCNC: 34 MG/DL (ref 7–18)
CALCIUM SERPL-MCNC: 8.4 MG/DL (ref 8.5–10.1)
CHLORIDE SERPL-SCNC: 102 MMOL/L (ref 98–107)
CO2 SERPL-SCNC: 36 MMOL/L (ref 21–32)
CREAT SERPL-MCNC: 0.8 MG/DL (ref 0.6–1.3)
GLUCOSE SERPL-MCNC: 274 MG/DL (ref 70–99)
HCT VFR BLD CALC: 26.4 % (ref 37–47)
HGB BLD-MCNC: 8.7 GM/DL (ref 12–15)
MCH RBC QN AUTO: 29 PG (ref 26–34)
MCHC RBC AUTO-ENTMCNC: 32.8 G/DL (ref 28–37)
MCV RBC: 88.6 FL (ref 80–100)
MPV: 8.6 FL. (ref 7.2–11.1)
PLATELET COUNT*: 252 THOU/UL (ref 150–400)
POTASSIUM SERPL-SCNC: 4.9 MMOL/L (ref 3.5–5.1)
RBC # BLD AUTO: 2.98 MIL/UL (ref 4.2–5)
RDW-CV: 14.4 % (ref 10.5–14.5)
SODIUM SERPL-SCNC: 140 MMOL/L (ref 136–145)
WBC # BLD AUTO: 10.7 THOU/UL (ref 4–11)

## 2021-09-03 PROCEDURE — 5A0935A ASSISTANCE WITH RESPIRATORY VENTILATION, LESS THAN 24 CONSECUTIVE HOURS, HIGH NASAL FLOW/VELOCITY: ICD-10-PCS | Performed by: PHYSICAL MEDICINE & REHABILITATION

## 2021-09-03 NOTE — NUR
PLAN TO RE-TEAM TO THE PT. PT PROGRESSING WELL TOWARDS GOALS. CM WILL REMAIN
AVAILABLE TO ASSIST AND FOLLOW AS NEEDED.

## 2021-09-03 NOTE — NUR
ASSUMED PT CARE AT 1930.  PT ALERT AND ORIENTED X4, SITTING UP IN RECLINER AT
SHIFT CHANGE.  PT PREFERS TO GO TO BED AFTER THE CONCLUSION OF THE NEWS. ON
10L HFC.  PT TRANSFERRED TO BSC TO VOID WITH MIN ASSIST OF ONE, GAIT BELT AND
WALKER.  PT TAKES PILLS WHOLE WITH WATER WITHOUT DIFFICULTY.  HS BLOOD SUGAR
257, INSULINS GIVEN PER MAR.  PT UP ONCE TO VOID PER BSC, DESATTED TO THE MID
80'S.  OXYGEN RAISED TO 15L HFC FOR APPROXIMATELY ONE HOUR AND REDUCED TO 10L
HFC UPON SAT RECHECK AT 98%. SPUTUM SAMPLE TO LAB.  PT SLEPT WELL OVERNIGHT.
USES CALL LIGHT APPROPRIATELY.  CALL LIGHT IN REACH, BED ALARM ON FOR SAFETY.
HOURLY ROUNDING IN PROGRESS, WILL CONTINUE TO MONITOR.

## 2021-09-04 VITALS — SYSTOLIC BLOOD PRESSURE: 132 MMHG | DIASTOLIC BLOOD PRESSURE: 53 MMHG

## 2021-09-04 VITALS — DIASTOLIC BLOOD PRESSURE: 53 MMHG | SYSTOLIC BLOOD PRESSURE: 136 MMHG

## 2021-09-04 LAB
ANION GAP SERPL CALC-SCNC: < 0 MMOL/L (ref 7–16)
BUN SERPL-MCNC: 45 MG/DL (ref 7–18)
CALCIUM SERPL-MCNC: 8.2 MG/DL (ref 8.5–10.1)
CHLORIDE SERPL-SCNC: 106 MMOL/L (ref 98–107)
CO2 SERPL-SCNC: 37 MMOL/L (ref 21–32)
CREAT SERPL-MCNC: 0.7 MG/DL (ref 0.6–1.3)
GLUCOSE SERPL-MCNC: 71 MG/DL (ref 70–99)
MAGNESIUM SERPL-MCNC: 1.8 MG/DL (ref 1.8–2.4)
POTASSIUM SERPL-SCNC: 4.9 MMOL/L (ref 3.5–5.1)
SODIUM SERPL-SCNC: 142 MMOL/L (ref 136–145)

## 2021-09-04 NOTE — NUR
PATIENT IN RECLINER AT BEGINNING OF SHIFT.  PT DENIES PAIN.  PT ON HIGH FLOW
CANNULA AT 15LITERS.  PT DESATS WITH ANY EXERTION.  PT WITH EDEMA ON LOWER
EXTREMITIES.  PT DENIES NEEDS AT THIS TIME.  FREQUENTLY  USED ITEMS AND CALL
LIGHT WITHIN REACH.  SIDERAILS UP AND BED ALARM ON.  WILL CONTINUE TO MONITOR.

## 2021-09-04 NOTE — NUR
SITTING UP IN A RECLINER WITH LEGS ELEVATED.  3+ LEFT ANKLE EDEMA AND 2+ RIGHT
ANKLE EDEMA NOTED.  02 HIGH FLOW NASAL CANNULA AT 10 LITERS.  SNACK PROVIDED.
CALL LIGHT WITHIN REACH.

## 2021-09-04 NOTE — NUR
PT UP WITH GAIT BELT AND WALKER. PT DID COMPLETE THERAPY TODAY. PT ON 15 L 02
THIS AM. PER V/O  PT WHEN RESTING SHOULD BE ON 10L 02. WHEN UP CAN BE
ON 15L 02. PT HAS IV TO RT AC. PT REFUSED TO WEAR DOMENICO HOSE DUE TO THEY CAUSE
PAIN. PT HAD FALL TODAY AT 1325.  OT ADAN STATED OT PUT PT ON COMMODE GAVE
PT CALL LIGHT AND STEPPED OUT, WENT BACK IN RM AND PT WAS ON THE FLOOR, THE PT
STATED SHE JUST WANTED TO WIPE HERSELF SO DID NOT PUSH CALL LIGHT. VS WERE
TAKEN, PT HAS A SKIN TEAR TO RT HAND AND RT FOREARM AND A BRUISE AND BUMP ON
FOREHEAD. DR MARCIAL AND NINO WERE NOTIFIED. DAUGHTER ADI DAVID WAS
NOTIFIED. PT HAD CT OF HEAD. SKIN TEARS WERE CLEANED AND WRAPPED. ICE PROVIDED
FOR HEAD. PT HAS NO COMPLAINTS OF PAIN. PT IS RESTING WITH CALL LIGHT IN REACH
AND FALL PRECAUTIONS IN PLACE.

## 2021-09-05 VITALS — DIASTOLIC BLOOD PRESSURE: 54 MMHG | SYSTOLIC BLOOD PRESSURE: 114 MMHG

## 2021-09-05 VITALS — DIASTOLIC BLOOD PRESSURE: 57 MMHG | SYSTOLIC BLOOD PRESSURE: 123 MMHG

## 2021-09-05 VITALS — DIASTOLIC BLOOD PRESSURE: 43 MMHG | SYSTOLIC BLOOD PRESSURE: 127 MMHG

## 2021-09-05 LAB
ABSOLUTE MONOCYTES: 0.5 THOU/UL (ref 0–1.2)
ALBUMIN SERPL-MCNC: 2.2 G/DL (ref 3.4–5)
ALP SERPL-CCNC: 150 U/L (ref 46–116)
ALT SERPL-CCNC: 24 U/L (ref 30–65)
ANION GAP SERPL CALC-SCNC: 1 MMOL/L (ref 7–16)
AST SERPL-CCNC: 20 U/L (ref 15–37)
BE: 7.3 MMOL/L
BILIRUB SERPL-MCNC: 0.3 MG/DL
BUN SERPL-MCNC: 44 MG/DL (ref 7–18)
CALCIUM SERPL-MCNC: 8.3 MG/DL (ref 8.5–10.1)
CHLORIDE SERPL-SCNC: 100 MMOL/L (ref 98–107)
CO2 SERPL-SCNC: 36 MMOL/L (ref 21–32)
CREAT SERPL-MCNC: 1.1 MG/DL (ref 0.6–1.3)
GLUCOSE SERPL-MCNC: 276 MG/DL (ref 70–99)
GRANULOCYTES NFR BLD MANUAL: 96 %
HCT VFR BLD CALC: 25.6 % (ref 37–47)
HGB BLD-MCNC: 8 GM/DL (ref 12–15)
LYMPHOCYTES # BLD: 0.2 THOU/UL (ref 0.8–5.3)
LYMPHOCYTES NFR BLD AUTO: 1 %
MCH RBC QN AUTO: 27.8 PG (ref 26–34)
MCHC RBC AUTO-ENTMCNC: 31.2 G/DL (ref 28–37)
MCV RBC: 89.3 FL (ref 80–100)
MONOCYTES NFR BLD: 3 %
MPV: 8.7 FL. (ref 7.2–11.1)
NEUTROPHILS # BLD: 15.2 THOU/UL (ref 1.6–8.1)
NUCLEATED RBCS: 0 /100WBC
PCO2 BLD: 50.9 MMHG (ref 35–45)
PLATELET # BLD EST: ADEQUATE 10*3/UL
PLATELET COUNT*: 216 THOU/UL (ref 150–400)
PO2 BLD: 78.1 MMHG (ref 75–100)
POTASSIUM SERPL-SCNC: 5.6 MMOL/L (ref 3.5–5.1)
PROT SERPL-MCNC: 6.1 G/DL (ref 6.4–8.2)
RBC # BLD AUTO: 2.87 MIL/UL (ref 4.2–5)
RBC MORPH BLD: NORMAL
RDW-CV: 14.5 % (ref 10.5–14.5)
SODIUM SERPL-SCNC: 137 MMOL/L (ref 136–145)
WBC # BLD AUTO: 15.8 THOU/UL (ref 4–11)

## 2021-09-05 PROCEDURE — 5A09357 ASSISTANCE WITH RESPIRATORY VENTILATION, LESS THAN 24 CONSECUTIVE HOURS, CONTINUOUS POSITIVE AIRWAY PRESSURE: ICD-10-PCS | Performed by: PHYSICAL MEDICINE & REHABILITATION

## 2021-09-05 PROCEDURE — 5A0935A ASSISTANCE WITH RESPIRATORY VENTILATION, LESS THAN 24 CONSECUTIVE HOURS, HIGH NASAL FLOW/VELOCITY: ICD-10-PCS | Performed by: PHYSICAL MEDICINE & REHABILITATION

## 2021-09-05 NOTE — NUR
PT IS UP WITH GAIT BELT AND WALKER. PT HAS BRUISE ON FOREHEAD AND SKIN TEAR TO
RT HAND AND RT FOREARM.  PT HAS 20G IV IN RT AC. PT HAS NOT COMPLAINED OF PAIN
PT IS ON 15L HIGH FLOW .  PT COMPLAINED OF SOA WHEN GETTING UP TO COMMODE 02
WAS 63 ON 15 L . NON RE RODRICKER PLACED ON PT AT 15l . PT ONLY STATING IN THE
88 TO 89.PT DID COMPLAIN THAT SHE COULD NOT EAT MUCH AT LUNCH DUE TO BEING
SOA. RT WAS CALLED PT IS NOW ON HEATED HIGH FLOW  WAS ON 45L 75% STAT AT 92%.
PT THEN COMPLAINED AGAIN ABOUT BEING SOA STAT WAS AGAIN IN 80"S . RT CAME BACK
UP AND BUT IT UP TO 60L 90% STAT AT 94%.  PT BS AT DINNER WAS 33 PT WAS GIVEN
DRINKS AND FRUIT BS WENT UP TO 64 . THEN DID EAT ALITTLE DINNER WAS . NO
INSULIN GIVEN .DR. MARCIAL WAS NOTIFIED, WANTED A STAT X-RAY AND TO CALL DR PAULINO WHO IS ON CALL FOR MER. CALL MADE X-RAY PUT IN.  PT IS RESTING WITH
CALL LIGHT IN REACH AND FALL PRECAUTIONS IN PLACE.

## 2021-09-05 NOTE — NUR
SITTING UP IN RECLINER WITH LEGS ELEVATED.  HAS 60 LITERS ON AND IS SATTING
93%.  TUBIGRIPS ON BILATERALLY.  EDEMA IN ANKLES APPEARS LESS THAN LAST NIGHT.
CALL LIGHT WITHIN REACH.

## 2021-09-05 NOTE — NUR
RESTED QUIETLY.  UP TO BEDSIDE COMMODE THIS MORNING TO VOID.  TRANSFERRED TO
RECLINER PER PATIENT'S REQUEST.  LEGS ELEVATED.  PATIENT WORE NON REBREATHER
WITH 02 AT 15 LITERS FOR ABOUT 15 MINUTES FOR COMPLAINT OF SHORTNESS OF BREATH
PRIOR TO SWITCHING BACK TO 10 LITERS HIGH FLOW NASAL CANNULA.  HOURLY ROUNDING
IN PROGRESS.

## 2021-09-05 NOTE — NUR
DR. PAULINO RETURNED CALL.  INFORMED DR. PAULINO OF CHEST X RAY RESULTS.  DR. PAULINO STATES WILL BE ENTERING LABS.  INFORMED NURSING SUPERVISOR, JORGE MUHAMMAD OF
PATIENT'S DECLINING STATUS AND THAT DR. PAULINO WAS GOING TO ORDER LABS.

## 2021-09-06 ENCOUNTER — HOSPITAL ENCOUNTER (INPATIENT)
Dept: HOSPITAL 96 - M.2W | Age: 81
LOS: 10 days | DRG: 177 | End: 2021-09-16
Attending: INTERNAL MEDICINE | Admitting: INTERNAL MEDICINE
Payer: MEDICARE

## 2021-09-06 VITALS — SYSTOLIC BLOOD PRESSURE: 108 MMHG | DIASTOLIC BLOOD PRESSURE: 46 MMHG

## 2021-09-06 VITALS — DIASTOLIC BLOOD PRESSURE: 49 MMHG | SYSTOLIC BLOOD PRESSURE: 106 MMHG

## 2021-09-06 VITALS — BODY MASS INDEX: 27.4 KG/M2 | HEIGHT: 60.98 IN | WEIGHT: 145.12 LBS

## 2021-09-06 VITALS — DIASTOLIC BLOOD PRESSURE: 46 MMHG | SYSTOLIC BLOOD PRESSURE: 108 MMHG

## 2021-09-06 VITALS — SYSTOLIC BLOOD PRESSURE: 116 MMHG | DIASTOLIC BLOOD PRESSURE: 49 MMHG

## 2021-09-06 DIAGNOSIS — J69.0: ICD-10-CM

## 2021-09-06 DIAGNOSIS — Z90.49: ICD-10-CM

## 2021-09-06 DIAGNOSIS — E03.9: ICD-10-CM

## 2021-09-06 DIAGNOSIS — K92.2: ICD-10-CM

## 2021-09-06 DIAGNOSIS — F32.9: ICD-10-CM

## 2021-09-06 DIAGNOSIS — J44.0: ICD-10-CM

## 2021-09-06 DIAGNOSIS — D64.9: ICD-10-CM

## 2021-09-06 DIAGNOSIS — F41.9: ICD-10-CM

## 2021-09-06 DIAGNOSIS — J12.82: ICD-10-CM

## 2021-09-06 DIAGNOSIS — E87.70: ICD-10-CM

## 2021-09-06 DIAGNOSIS — I26.99: ICD-10-CM

## 2021-09-06 DIAGNOSIS — Z88.1: ICD-10-CM

## 2021-09-06 DIAGNOSIS — I35.0: ICD-10-CM

## 2021-09-06 DIAGNOSIS — E11.42: ICD-10-CM

## 2021-09-06 DIAGNOSIS — Z66: ICD-10-CM

## 2021-09-06 DIAGNOSIS — U07.1: Primary | ICD-10-CM

## 2021-09-06 DIAGNOSIS — J44.1: ICD-10-CM

## 2021-09-06 DIAGNOSIS — Z87.891: ICD-10-CM

## 2021-09-06 DIAGNOSIS — Z88.8: ICD-10-CM

## 2021-09-06 DIAGNOSIS — Z51.5: ICD-10-CM

## 2021-09-06 DIAGNOSIS — Z95.0: ICD-10-CM

## 2021-09-06 DIAGNOSIS — I48.0: ICD-10-CM

## 2021-09-06 DIAGNOSIS — J80: ICD-10-CM

## 2021-09-06 DIAGNOSIS — E11.65: ICD-10-CM

## 2021-09-06 DIAGNOSIS — I10: ICD-10-CM

## 2021-09-06 LAB
ABSOLUTE BASOPHILS: 0 THOU/UL (ref 0–0.2)
ABSOLUTE EOSINOPHILS: 0 THOU/UL (ref 0–0.7)
ABSOLUTE MONOCYTES: 0.5 THOU/UL (ref 0–1.2)
ALBUMIN SERPL-MCNC: 2.1 G/DL (ref 3.4–5)
ALP SERPL-CCNC: 143 U/L (ref 46–116)
ALT SERPL-CCNC: 23 U/L (ref 30–65)
ANION GAP SERPL CALC-SCNC: 1 MMOL/L (ref 7–16)
AST SERPL-CCNC: 20 U/L (ref 15–37)
BASOPHILS NFR BLD AUTO: 0.1 %
BILIRUB SERPL-MCNC: 0.3 MG/DL
BUN SERPL-MCNC: 41 MG/DL (ref 7–18)
CALCIUM SERPL-MCNC: 8.4 MG/DL (ref 8.5–10.1)
CHLORIDE SERPL-SCNC: 100 MMOL/L (ref 98–107)
CO2 SERPL-SCNC: 37 MMOL/L (ref 21–32)
CREAT SERPL-MCNC: 1 MG/DL (ref 0.6–1.3)
EOSINOPHIL NFR BLD: 0 %
GLUCOSE SERPL-MCNC: 206 MG/DL (ref 70–99)
GRANULOCYTES NFR BLD MANUAL: 94.6 %
HCT VFR BLD CALC: 25.3 % (ref 37–47)
HGB BLD-MCNC: 8.1 GM/DL (ref 12–15)
LYMPHOCYTES # BLD: 0.2 THOU/UL (ref 0.8–5.3)
LYMPHOCYTES NFR BLD AUTO: 1.6 %
MAGNESIUM SERPL-MCNC: 2 MG/DL (ref 1.8–2.4)
MCH RBC QN AUTO: 28.6 PG (ref 26–34)
MCHC RBC AUTO-ENTMCNC: 32.1 G/DL (ref 28–37)
MCV RBC: 89 FL (ref 80–100)
MONOCYTES NFR BLD: 3.7 %
MPV: 9.1 FL. (ref 7.2–11.1)
NEUTROPHILS # BLD: 12.3 THOU/UL (ref 1.6–8.1)
NUCLEATED RBCS: 0 /100WBC
PLATELET COUNT*: 208 THOU/UL (ref 150–400)
POTASSIUM SERPL-SCNC: 5 MMOL/L (ref 3.5–5.1)
PROT SERPL-MCNC: 6 G/DL (ref 6.4–8.2)
RBC # BLD AUTO: 2.84 MIL/UL (ref 4.2–5)
RDW-CV: 14.3 % (ref 10.5–14.5)
SODIUM SERPL-SCNC: 138 MMOL/L (ref 136–145)
WBC # BLD AUTO: 13 THOU/UL (ref 4–11)

## 2021-09-06 PROCEDURE — 5A0935A ASSISTANCE WITH RESPIRATORY VENTILATION, LESS THAN 24 CONSECUTIVE HOURS, HIGH NASAL FLOW/VELOCITY: ICD-10-PCS | Performed by: INTERNAL MEDICINE

## 2021-09-06 NOTE — NUR
PT ON HEATED HIGH FLOW 60L , PT HAS TIMMONS 675 WAS OUT PUT AT 1500. PT HAS IV
IN RT AC 20G. PT HAS SKIN TEAR X 2 ON RT HAND AND FOREARM FROM FALL ON 9-4 AND
BRUISE TO FOREHEAD. PT BS AT 1200  24 UNITS OF INSULIN WAS GIVEN. PT
DID HAVE BM TODAY. PT UNABLE TO COMPLETE THERAPY DUE TO SOB.
IS RESTING IN BED WITH CALL LIGHT IN REACH AND FALL PRECAUTIONS IN PLACE.

## 2021-09-06 NOTE — NUR
ABG'S AND LABS WERE DONE AND RESULTS REPORTED TO DR. HERNANDEZ.  PATIENT GIVEN IV
LASIX WITH GOOD RESULTS.  TIMMONS INSERTED AND OUTPUT WAS 1675ML.  PATIENT WAS
% AND 60 LITERS AT THE BEGINNING OF THE SHIFT.  AFTER LASIX WAS GIVEN
PATIENT'S BREATHING IMPROVED AND OXYGEN WAS DECREASED TO 55 LITERS AND 86%.
PATIENT IS ON A CONTINUOUS OXGEN SATURATION MONITOR.  PATIENT'S OXYGEN
SATURATION WAS 92% MOST OF THE NIGHT.  HOURLY ROUNDING IN PROGRESS.

## 2021-09-06 NOTE — NUR
PT BROUGHT DOWN FROM REHAB TO BE ADMITTED TO THE UNIT FOR SOB AND ON 60LHIGH
FLOW. PT HAS TIMMONS CATH. PT HAS SWELLING IN HER STEVIE LE. PT RESTING ON HER BED,
CALL LIGHT CLOSE. DAUGHTER CALLED TO CHECK ON PT AND STRESSED THAT HER BLOOD
SUGARS ARE REALLY CRAZY ANYMORE HER BLOOD SUGAR CAN BE IN  THEN DROPPS
TO 70 THEN 30. PLEASE WATCH THEM CLOSELY. PT ADMITTED FOR RESPIRATORY FAILURE.
WILL CONTINUE TO MONITOR PLAN OF CARE.

## 2021-09-07 VITALS — SYSTOLIC BLOOD PRESSURE: 125 MMHG | DIASTOLIC BLOOD PRESSURE: 59 MMHG

## 2021-09-07 VITALS — SYSTOLIC BLOOD PRESSURE: 121 MMHG | DIASTOLIC BLOOD PRESSURE: 47 MMHG

## 2021-09-07 VITALS — DIASTOLIC BLOOD PRESSURE: 43 MMHG | SYSTOLIC BLOOD PRESSURE: 117 MMHG

## 2021-09-07 VITALS — DIASTOLIC BLOOD PRESSURE: 41 MMHG | SYSTOLIC BLOOD PRESSURE: 103 MMHG

## 2021-09-07 VITALS — SYSTOLIC BLOOD PRESSURE: 122 MMHG | DIASTOLIC BLOOD PRESSURE: 49 MMHG

## 2021-09-07 VITALS — DIASTOLIC BLOOD PRESSURE: 47 MMHG | SYSTOLIC BLOOD PRESSURE: 94 MMHG

## 2021-09-07 LAB
ALBUMIN SERPL-MCNC: 2 G/DL (ref 3.4–5)
ALP SERPL-CCNC: 149 U/L (ref 46–116)
ALT SERPL-CCNC: 22 U/L (ref 30–65)
ANION GAP SERPL CALC-SCNC: 2 MMOL/L (ref 7–16)
AST SERPL-CCNC: 20 U/L (ref 15–37)
BILIRUB SERPL-MCNC: 0.3 MG/DL
BUN SERPL-MCNC: 41 MG/DL (ref 7–18)
CALCIUM SERPL-MCNC: 8.2 MG/DL (ref 8.5–10.1)
CHLORIDE SERPL-SCNC: 98 MMOL/L (ref 98–107)
CO2 SERPL-SCNC: 36 MMOL/L (ref 21–32)
CREAT SERPL-MCNC: 1 MG/DL (ref 0.6–1.3)
GLUCOSE SERPL-MCNC: 176 MG/DL (ref 70–99)
POTASSIUM SERPL-SCNC: 4 MMOL/L (ref 3.5–5.1)
PROT SERPL-MCNC: 5.9 G/DL (ref 6.4–8.2)
SODIUM SERPL-SCNC: 136 MMOL/L (ref 136–145)

## 2021-09-07 PROCEDURE — 5A0945A ASSISTANCE WITH RESPIRATORY VENTILATION, 24-96 CONSECUTIVE HOURS, HIGH NASAL FLOW/VELOCITY: ICD-10-PCS | Performed by: INTERNAL MEDICINE

## 2021-09-07 NOTE — NUR
PHYSICIAN INFORMS THAT THE PT IS NOT MEDCIALLY STABLE TO D/C TODAY. UNSURE IF
PT WILL BE ABLE TO RETURN TO INPT ARU AT D/C.  CM WILL REMAIN AVAILABLE TO
ASSIST AND FOLLOW AS NEEDED.

## 2021-09-07 NOTE — NUR
PT ALERT ORIENTED. ON BR O2 AT 60 LITERS HIGH FLOW. CARDIAC MONITOR TRACING
V PACED, AV PACED. SHANELLE VELAZQUEZ DD.

## 2021-09-08 VITALS — DIASTOLIC BLOOD PRESSURE: 41 MMHG | SYSTOLIC BLOOD PRESSURE: 110 MMHG

## 2021-09-08 VITALS — DIASTOLIC BLOOD PRESSURE: 50 MMHG | SYSTOLIC BLOOD PRESSURE: 126 MMHG

## 2021-09-08 VITALS — SYSTOLIC BLOOD PRESSURE: 104 MMHG | DIASTOLIC BLOOD PRESSURE: 55 MMHG

## 2021-09-08 VITALS — DIASTOLIC BLOOD PRESSURE: 62 MMHG | SYSTOLIC BLOOD PRESSURE: 118 MMHG

## 2021-09-08 VITALS — DIASTOLIC BLOOD PRESSURE: 55 MMHG | SYSTOLIC BLOOD PRESSURE: 104 MMHG

## 2021-09-08 LAB
ABSOLUTE BASOPHILS: 0 THOU/UL (ref 0–0.2)
ABSOLUTE EOSINOPHILS: 0 THOU/UL (ref 0–0.7)
ABSOLUTE MONOCYTES: 0.1 THOU/UL (ref 0–1.2)
ALBUMIN SERPL-MCNC: 1.8 G/DL (ref 3.4–5)
ALP SERPL-CCNC: 140 U/L (ref 46–116)
ALT SERPL-CCNC: 22 U/L (ref 30–65)
ANION GAP SERPL CALC-SCNC: 3 MMOL/L (ref 7–16)
AST SERPL-CCNC: 21 U/L (ref 15–37)
BASOPHILS NFR BLD AUTO: 0.2 %
BILIRUB SERPL-MCNC: 0.4 MG/DL
BUN SERPL-MCNC: 35 MG/DL (ref 7–18)
CALCIUM SERPL-MCNC: 7.6 MG/DL (ref 8.5–10.1)
CHLORIDE SERPL-SCNC: 93 MMOL/L (ref 98–107)
CO2 SERPL-SCNC: 35 MMOL/L (ref 21–32)
CREAT SERPL-MCNC: 1 MG/DL (ref 0.6–1.3)
EOSINOPHIL NFR BLD: 0 %
GLUCOSE SERPL-MCNC: 293 MG/DL (ref 70–99)
GRANULOCYTES NFR BLD MANUAL: 98.1 %
HCT VFR BLD CALC: 23.7 % (ref 37–47)
HGB BLD-MCNC: 7.9 GM/DL (ref 12–15)
INR PPP: 1
LYMPHOCYTES # BLD: 0.1 THOU/UL (ref 0.8–5.3)
LYMPHOCYTES NFR BLD AUTO: 1.1 %
MAGNESIUM SERPL-MCNC: 2.1 MG/DL (ref 1.8–2.4)
MCH RBC QN AUTO: 29.3 PG (ref 26–34)
MCHC RBC AUTO-ENTMCNC: 33.5 G/DL (ref 28–37)
MCV RBC: 87.3 FL (ref 80–100)
MONOCYTES NFR BLD: 0.6 %
MPV: 8.4 FL. (ref 7.2–11.1)
NEUTROPHILS # BLD: 9.9 THOU/UL (ref 1.6–8.1)
NUCLEATED RBCS: 0 /100WBC
PLATELET COUNT*: 180 THOU/UL (ref 150–400)
POTASSIUM SERPL-SCNC: 4.6 MMOL/L (ref 3.5–5.1)
PROT SERPL-MCNC: 5.5 G/DL (ref 6.4–8.2)
PROTHROMBIN TIME: 10.9 SECONDS (ref 9.2–11.5)
RBC # BLD AUTO: 2.71 MIL/UL (ref 4.2–5)
RDW-CV: 14.4 % (ref 10.5–14.5)
SODIUM SERPL-SCNC: 131 MMOL/L (ref 136–145)
WBC # BLD AUTO: 10.1 THOU/UL (ref 4–11)

## 2021-09-08 NOTE — NUR
CARDIAC MONITOR TRACKING WITH NO CHANGE IN RHYTHM.  REMAINS ON HF HEATED 02,
02 SAT LOW 90'S.  FAMILY IN EARLIER TO VISIT.  NO COMPLAINTS OF PAIN.
TOLERATING DIET, CONDITION UNCHANGED.  WILL CONTINUE WITH PLAN OF CARE.

## 2021-09-08 NOTE — 2DMMODE
[place] [address]
Phone:  [phone] 2 D/M-MODE ECHOCARDIOGRAM     
_______________________________________________________________________________
 
Name:         FRANDY DIAZ           Room:          [address] [place] 
[facility]#:    [id number]     Account #:     [id number]  
Admission:    21    Attend Phys:   Jasvir Ordonez
Discharge:                Date of Birth: [birth year]  
Date of Service: 21 1606  Report #:      [id number]
        [id number]
_______________________________________________________________________________
THIS REPORT FOR:
 
cc:  Roberta Martins MD, Sarah Beth MD Holkins,Carlton THURMAN MD [facility]       
                                                                       ~
 
--------------- APPROVED REPORT --------------
 
 
Study performed:  2021 10:28:39
 
EXAM: Comprehensive 2D, Doppler, and color-flow 
Echocardiogram 
Patient Location: In-Patient   
Room #:  [facility]     Status:  routine
 
      BSA:         1.65
HR: 77 bpm BP:          104/55 mmHg 
Rhythm: NSR     
 
Other Information 
Study Quality: Good
 
Indications
Dyspnea 
 
2D Dimensions
IVSd:  8.88 (7-11mm) LVOT Diam:  19.22 (18-24mm) 
LVDd:  37.03 mm  
PWd:  9.29 (7-11mm) Ascending Ao:  31.25 (22-36mm)
LVDs:  18.14 (25-40mm) 
 
Volumes
Left Atrial Volume (Systole) 
    LA ESV Index:  39.50 mL/m2
 
Aortic Valve
AoV Peak Laron.:  3.15 m/s 
AO Peak Gr.:  39.77 mmHg LVOT Max P.29 mmHg
AO Mean Gr.:  20.41 mmHg LVOT Mean P.08 mmHg
    LVOT Max V:  1.04 m/s
AO V2 VTI:  58.97 cm  LVOT Mean V:  0.66 m/s
VICENTE (VTI):  1.08 cm2  LVOT V1 VTI:  21.90 cm
AI Whitley:  2.20 m/s2  
AI PHT:  487.01 ms  
 
 
[place] [address]
Phone:  [phone]                     2 D/M-MODE ECHOCARDIOGRAM     
_______________________________________________________________________________
 
Name:         FRANDY DIAZ           Room:          [address] [place] 
.R.#:    [id number]     Account #:     [id number]  
Admission:    21    Attend Phys:   Jasivr Ordonez
Discharge:                Date of Birth: [birth year]  
Date of Service: 21 1606  Report #:      [id number]
        [id number]
_______________________________________________________________________________
 
Mitral Valve
MV Mean Gr.:  2.85 mmHg  E/A Ratio:  0.49
    MV Decel. Time:  275.53 ms
MV E Max Laron.:  0.56 m/s 
MV PHT:  79.91 ms  
MVA (PHT):  2.75 cm2  
 
TDI
E/Lateral E':  6.22 E/Medial E':  7.00
   Medial E' Laron.:  0.08 m/s
   Lateral E' Laron.:  0.09 m/s
 
Pulmonary Valve
PV Peak Laron.:  1.12 m/s PV Peak Gr.:  5.06 mmHg
 
Tricuspid Valve
    RAP Estimate:  5.00 mmHg
TR Peak Gr.:  54.74 mmHg RVSP:  59.00 mmHg
    PA Pressure:  59.00 mmHg
 
Left Ventricle
The left ventricle is normal size. There is normal LV segmental wall 
motion. Moderate concentric left ventricular hypertrophy. Left 
ventricular systolic function is normal. The left ventricular 
ejection fraction is within the normal range. LVEF is 65%. Grade I - 
abnormal relaxation pattern.
 
Right Ventricle
Right ventricle is mildly dilated. The right ventricular systolic 
function is normal. Pacemaker lead is present in the right ventricle. 
 
Atria
Left atrium is mildly dilated. Right atrium is mildly 
dilated.
 
Aortic Valve
Moderate to severe aortic valve sclerosis. Mild aortic regurgitation. 
Moderate aortic stenosis.
 
Mitral Valve
There is mitral annular calcification. Mild mitral regurgitation. No 
evidence of mitral valve stenosis.
 
Tricuspid Valve
The tricuspid valve is normal in structure. Mild tricuspid 
 
 
[place] [address]
Phone:  [phone]                     2 D/M-MODE ECHOCARDIOGRAM     
_______________________________________________________________________________
 
Name:         FRANDY DIAZ           Room:          [address] [place] 
[facility]#:    [id number]     Account #:     [id number]  
Admission:    21    Attend Phys:   Jasvir Ordonez
Discharge:                Date of Birth: [birth year]  
Date of Service: 21 1606  Report #:      [id number]
        [id number]
_______________________________________________________________________________
regurgitation. Moderate pulmonary hypertension.
 
Pulmonic Valve
The pulmonary valve is normal in structure. Mild pulmonic 
regurgitation.
 
Great Vessels
The aortic root is normal in size. IVC is normal in size and 
collapses >50% with inspiration.
 
Pericardium
There is no pericardial effusion.
 
<Conclusion>
The left ventricle is normal size.
Moderate concentric left ventricular hypertrophy.
Left ventricular systolic function is normal.
The left ventricular ejection fraction is within the normal 
range.
LVEF is 65%.
Grade I - abnormal relaxation pattern.
Right ventricle is mildly dilated.
The right ventricular systolic function is normal.
Left atrium is mildly dilated.
Right atrium is mildly dilated.
Moderate to severe aortic valve sclerosis.
Mild aortic regurgitation.
Moderate aortic stenosis.
There is mitral annular calcification.
Mild mitral regurgitation.
No evidence of mitral valve stenosis.
The tricuspid valve is normal in structure.
Mild tricuspid regurgitation.
Moderate pulmonary hypertension.
IVC is normal in size and collapses >50% with inspiration.
There is no pericardial effusion.
There is normal LV segmental wall motion.
 
 
 
 
 
 
 
  <ELECTRONICALLY SIGNED>
                                           By: Carlton Dominguez MD, FACC     
  21     1606
D: 21 1606   _____________________________________
T: 21 1606   Carlton Dominguez MD, FACC       /INF

## 2021-09-08 NOTE — NUR
PATIENT DAUGHTER CALLING OUT EARLIER APPOX 1740 STATING HER MOTHER NOT FEELING
WELL, FEELING SHAKEY.  ACCU CHECK OBTAINED WITH RESULT OF 30 GIVEN JUICE X 2
AND ORAL GLUCO-GEL - RECHECKED WITH RESULT OF 29, PATIENT GIVEN 1 AMP OF D-50,
THEN NOTED PATIENT CARDIAC MONITOR ALARMING AFIB-RVR RATE 112 'S, STAT
EKG OBTAINED, CALL PLACED TO DR MARCIAL NOTIFIED, ORDERS RECEIVED.  CALL PLACED
TO CARIOLOGY FOR CONSULT.  PATIENT GIVEN IV DIG PER ORDERS. /58 
RESPIRATION 18 - HR RANGING FROM 102 'S THEN BACK TO 'S. CARDIAC
MONITOR CONTINUES TO TRACK AFIB WITH PERIODS OF RVR.  BLOOD SUGAR RECHEKED
WITH RESULT  THEN LATER AFTER FREQUENT CHECKS RESULT STARTING TO
DROP 148 .  WILL MONITOR.

## 2021-09-08 NOTE — NUR
PT ALERT ORIENTED. ABD SLIGHTLY DISTENDED. DENIES PAIN. CARDIAC MONITOR
TRACING V-PACED, AV-PACED. PT'S DAUGHTER CALLED UNIT TO ASK ABOUT CT.
REASURANCE PROVIDED. PT WENT TO CAT SCAN AFTER MN. PT TRAVELED WITH RN ON
100%NRB MASK AND 15 LITERS HIGH FLOW NC. PT MAINTAINED O2 SATS IN THE UPPER
90S.

## 2021-09-09 VITALS — DIASTOLIC BLOOD PRESSURE: 44 MMHG | SYSTOLIC BLOOD PRESSURE: 128 MMHG

## 2021-09-09 VITALS — SYSTOLIC BLOOD PRESSURE: 117 MMHG | DIASTOLIC BLOOD PRESSURE: 41 MMHG

## 2021-09-09 VITALS — SYSTOLIC BLOOD PRESSURE: 141 MMHG | DIASTOLIC BLOOD PRESSURE: 54 MMHG

## 2021-09-09 VITALS — SYSTOLIC BLOOD PRESSURE: 134 MMHG | DIASTOLIC BLOOD PRESSURE: 52 MMHG

## 2021-09-09 VITALS — SYSTOLIC BLOOD PRESSURE: 118 MMHG | DIASTOLIC BLOOD PRESSURE: 48 MMHG

## 2021-09-09 VITALS — SYSTOLIC BLOOD PRESSURE: 138 MMHG | DIASTOLIC BLOOD PRESSURE: 46 MMHG

## 2021-09-09 LAB
ABSOLUTE BASOPHILS: 0 THOU/UL (ref 0–0.2)
ABSOLUTE EOSINOPHILS: 0 THOU/UL (ref 0–0.7)
ABSOLUTE MONOCYTES: 0.6 THOU/UL (ref 0–1.2)
ANION GAP SERPL CALC-SCNC: 0 MMOL/L (ref 7–16)
BASOPHILS NFR BLD AUTO: 0.2 %
BE: 5.9 MMOL/L
BUN SERPL-MCNC: 46 MG/DL (ref 7–18)
CALCIUM SERPL-MCNC: 8.3 MG/DL (ref 8.5–10.1)
CHLORIDE SERPL-SCNC: 101 MMOL/L (ref 98–107)
CO2 SERPL-SCNC: 36 MMOL/L (ref 21–32)
CREAT SERPL-MCNC: 1.1 MG/DL (ref 0.6–1.3)
EOSINOPHIL NFR BLD: 0 %
GLUCOSE SERPL-MCNC: 179 MG/DL (ref 70–99)
GRANULOCYTES NFR BLD MANUAL: 93.4 %
HCT VFR BLD CALC: 22.9 % (ref 37–47)
HGB BLD-MCNC: 7.3 GM/DL (ref 12–15)
LYMPHOCYTES # BLD: 0.2 THOU/UL (ref 0.8–5.3)
LYMPHOCYTES NFR BLD AUTO: 1.4 %
MAGNESIUM SERPL-MCNC: 2.3 MG/DL (ref 1.8–2.4)
MCH RBC QN AUTO: 28.3 PG (ref 26–34)
MCHC RBC AUTO-ENTMCNC: 32 G/DL (ref 28–37)
MCV RBC: 88.3 FL (ref 80–100)
MONOCYTES NFR BLD: 5 %
MPV: 8.3 FL. (ref 7.2–11.1)
NEUTROPHILS # BLD: 11.7 THOU/UL (ref 1.6–8.1)
NUCLEATED RBCS: 0 /100WBC
PCO2 BLD: 51.9 MMHG (ref 35–45)
PLATELET COUNT*: 176 THOU/UL (ref 150–400)
PO2 BLD: 66.2 MMHG (ref 75–100)
POTASSIUM SERPL-SCNC: 4 MMOL/L (ref 3.5–5.1)
RBC # BLD AUTO: 2.6 MIL/UL (ref 4.2–5)
RDW-CV: 14.2 % (ref 10.5–14.5)
SODIUM SERPL-SCNC: 137 MMOL/L (ref 136–145)
WBC # BLD AUTO: 12.5 THOU/UL (ref 4–11)

## 2021-09-09 NOTE — NUR
ASSUMED CARE OF PT AT 0730. PT A&0X4, DENIES ANY PAIN AT THIS TIME. TRACING AV
PACED ON THE CARDIAC MONITOR. RATE IN THE 60'S-70'S. PT ON 50L HEATED HIGH
FLOW AT 80% SAT MID 90'S. PT UP WITH MAX ASSIST. DAUGHTER AT BEDSIDE AND
UPDATED ON CURRENT PLAN OF CARE. TIMMONS TO DEPENDENT DRAINAGE. CARDIOLOGY
HERE TO SEE PT THIS AM AND PT STARTED ON PO AMIO. REFER TO EMAR. PT BLOOD
SUGAR HYPO THIS AM-APPLE JUICE WITH SUGAR GIVEN AND BLOOD GLUCOSE MONITORED
CLOSELY. AM AND LUNCH INSULIN HELD.  PT GOAL FOR TODAY IS TITRATE OXYGEN AND
MAINTAIN REGULAR RHYTHM. AM ASSESSMENT AS CHARTED.  MEDICATIONS PER MAR. PT
REPOSITIONED EVERY 2 HOURS FOR COMFORT. HOURLY ROUNDING OBSERVED. BED IN LOW
POSITION. BED ALARM IN PLACE. FALL PRECAUTIONS IN PLACE. CALL LIGHT WITHIN
REACH. WILL CONTINUE PLAN OF CARE.

## 2021-09-09 NOTE — EKG
[place] [address]
Phone:  [phone]                     ELECTROCARDIOGRAM REPORT      
_______________________________________________________________________________
 
Name:         YOLI DIAZEUSEBIA ARNOLDE           Room:          [address]    [place] 
M.R.#:    [id number]     Account #:     [id number]  
Admission:    21    Attend Phys:   Jasvir Ordonez
Discharge:                Date of Birth: [birth year]  
Date of Service: 21 1632  Report #:      [id number]
        [id number]
_______________________________________________________________________________
THIS REPORT FOR:  //name//                      
 
                          [facility]
                                       
Test Date:    2021               Test Time:    16:32:56
Pat Name:     FRANDY DIAZ               Department:   
Patient ID:   [id number]            Room:         [address]
Gender:       F                        Technician:   jordyn
:          [id number]               Requested By: Mckenna Oneil
Order Number: [id number]    Reading MD:   Tip Penn
                                 Measurements
Intervals                              Axis          
Rate:         123                      P:            
WV:                                    QRS:          -77
QRSD:         123                      T:            -13
QT:           339                                    
QTc:          485                                    
                           Interpretive Statements
Atrial fibrillation
RBBB and LAFB
 
Compared to ECG 2021 09:27:51
 
atrial fibrillation now noted
 
Ventricular-paced complex(es) or rhythm no longer present
Electronically Signed On 2021 12:17:40 CDT by Tip Penn
[email or web address]
 
 
 
 
 
 
 
 
 
 
 
 
 
 
 
 
  <ELECTRONICALLY SIGNED>
                                           By: Tip Penn MD, [facility]      
  21     1217
D: 21 1632   _____________________________________
T: 21 1632   Tip Penn MD, [facility]        /EPI

## 2021-09-10 VITALS — SYSTOLIC BLOOD PRESSURE: 153 MMHG | DIASTOLIC BLOOD PRESSURE: 50 MMHG

## 2021-09-10 VITALS — DIASTOLIC BLOOD PRESSURE: 54 MMHG | SYSTOLIC BLOOD PRESSURE: 114 MMHG

## 2021-09-10 VITALS — DIASTOLIC BLOOD PRESSURE: 31 MMHG | SYSTOLIC BLOOD PRESSURE: 109 MMHG

## 2021-09-10 VITALS — DIASTOLIC BLOOD PRESSURE: 40 MMHG | SYSTOLIC BLOOD PRESSURE: 134 MMHG

## 2021-09-10 VITALS — DIASTOLIC BLOOD PRESSURE: 45 MMHG | SYSTOLIC BLOOD PRESSURE: 118 MMHG

## 2021-09-10 VITALS — SYSTOLIC BLOOD PRESSURE: 114 MMHG | DIASTOLIC BLOOD PRESSURE: 45 MMHG

## 2021-09-10 VITALS — SYSTOLIC BLOOD PRESSURE: 123 MMHG | DIASTOLIC BLOOD PRESSURE: 54 MMHG

## 2021-09-10 LAB
ALBUMIN SERPL-MCNC: 2.2 G/DL (ref 3.4–5)
ALP SERPL-CCNC: 165 U/L (ref 46–116)
ALT SERPL-CCNC: 59 U/L (ref 30–65)
ANION GAP SERPL CALC-SCNC: 3 MMOL/L (ref 7–16)
AST SERPL-CCNC: 31 U/L (ref 15–37)
BILIRUB SERPL-MCNC: 0.4 MG/DL
BUN SERPL-MCNC: 43 MG/DL (ref 7–18)
CALCIUM SERPL-MCNC: 8.4 MG/DL (ref 8.5–10.1)
CHLORIDE SERPL-SCNC: 100 MMOL/L (ref 98–107)
CO2 SERPL-SCNC: 33 MMOL/L (ref 21–32)
CREAT SERPL-MCNC: 1 MG/DL (ref 0.6–1.3)
GLUCOSE SERPL-MCNC: 243 MG/DL (ref 70–99)
GRANULOCYTES NFR BLD MANUAL: 99 %
HCT VFR BLD CALC: 21.8 % (ref 37–47)
HGB BLD-MCNC: 7.2 GM/DL (ref 12–15)
LYMPHOCYTES # BLD: 0.1 THOU/UL (ref 0.8–5.3)
LYMPHOCYTES NFR BLD AUTO: 1 %
MAGNESIUM SERPL-MCNC: 2.3 MG/DL (ref 1.8–2.4)
MCH RBC QN AUTO: 29.1 PG (ref 26–34)
MCHC RBC AUTO-ENTMCNC: 33 G/DL (ref 28–37)
MCV RBC: 88.2 FL (ref 80–100)
MPV: 9.1 FL. (ref 7.2–11.1)
NEUTROPHILS # BLD: 12.9 THOU/UL (ref 1.6–8.1)
NUCLEATED RBCS: 0 /100WBC
PLATELET # BLD EST: ADEQUATE 10*3/UL
PLATELET COUNT*: 162 THOU/UL (ref 150–400)
POTASSIUM SERPL-SCNC: 5.1 MMOL/L (ref 3.5–5.1)
PROT SERPL-MCNC: 5.6 G/DL (ref 6.4–8.2)
RBC # BLD AUTO: 2.47 MIL/UL (ref 4.2–5)
RBC MORPH BLD: NORMAL
RDW-CV: 14.1 % (ref 10.5–14.5)
SODIUM SERPL-SCNC: 136 MMOL/L (ref 136–145)
WBC # BLD AUTO: 13 THOU/UL (ref 4–11)

## 2021-09-10 PROCEDURE — 5A0935A ASSISTANCE WITH RESPIRATORY VENTILATION, LESS THAN 24 CONSECUTIVE HOURS, HIGH NASAL FLOW/VELOCITY: ICD-10-PCS | Performed by: INTERNAL MEDICINE

## 2021-09-10 NOTE — EKG
[place] [address]
Phone:  [phone]                     ELECTROCARDIOGRAM REPORT      
_______________________________________________________________________________
 
Name:         DIAZFRANDYEUSEBIA VILLASENOR           Room:          [address]    ADM IN 
M.R.#:    [id number]     Account #:     [id number]  
Admission:    21    Attend Phys:   Jasvir Ordonez
Discharge:                Date of Birth: [birth year]  
Date of Service: 09/10/21 1014  Report #:      [id number]
        [id number]
_______________________________________________________________________________
THIS REPORT FOR:  //name//                      
 
                          [facility]
                                       
Test Date:    2021-09-10               Test Time:    10:14:09
Pat Name:     FRANDY DIAZ               Department:   
Patient ID:   [id number]            Room:         [address]
Gender:       F                        Technician:   
:          [id number]               Requested By: Kamila Hull
Order Number: [id number]    Reading MD:   Tip Penn
                                 Measurements
Intervals                              Axis          
Rate:         70                       P:            29
AZ:           151                      QRS:          -80
QRSD:         126                      T:            158
QT:           467                                    
QTc:          504                                    
                           Interpretive Statements
Sinus rhythm
Supraventricular bigeminy
RBBB and LAFB
 
Compared to ECG 2021 16:32:56
 
 
 
Atrial fibrillation no longer present
Electronically Signed On 9- 14:35:51 CDT by Tip Penn
[email or web address]
 
 
 
 
 
 
 
 
 
 
 
 
 
 
 
  <ELECTRONICALLY SIGNED>
                                           By: Tip Penn MD, [facility]      
  09/10/21     1435
D: 09/10/21 1014   _____________________________________
T: 09/10/21 1014   Tip Penn MD, [facility]        /EPI

## 2021-09-10 NOTE — NUR
Received care of pt at 0730, obtained report from night RN. Pt A&OX4,
afebrile, no complaints of pain, but has c/o SOA and requested PRN Ativan. Pt
is bedrest, turns on her own with encouragement and minimal help from nursing
staff. Lungs are diminished throughout, pt is on the Optiflow at 50L, 80%
FiO2. Breaths are not labored but pt does desat very easily with activity.
Heart sounds regular, S1S2 heard, generalized edema, non pitting, palpable
pulses. Pt able to void on her own. Pt had one large bloody stool overnight
per night shift report and a very large, maroon, watery bowel movement around
1130 this morning. Pt later had c/o "feeling full" and nausea. Pt started
belching loudly. RN palpated pt's abdomen, abd is soft, tender, with mulitple
small hematomas across her abdominal region. Dr. Oneil notified and aware,
provider at the bedside and assessed pt. New medication orders received, no
further interventions at this time with the dark colored stools, will continue
to monitor.
 
Pt had 3 medium, watery, dark colored stools at 1830. No katharina blood noted. Hg
was checked and 7.1. No blood replacement needed at this time. Will continue
to monitor and make changes as necessary.

## 2021-09-10 NOTE — NUR
PLAN OF CARE:
PHYSICIAN INFORMS OF PLAN FOR THE TO REMAIN INPT THROUGH THE WEEKEND. PLAN
REMAINS FOR PT TO RETURN TO INPT ARU WHEN MEDICALLY STABLE. CM WILL REMAIN
AVAILABLE TO ASSIST AND FOLLOW AS NEEDED.

## 2021-09-11 VITALS — DIASTOLIC BLOOD PRESSURE: 44 MMHG | SYSTOLIC BLOOD PRESSURE: 133 MMHG

## 2021-09-11 VITALS — SYSTOLIC BLOOD PRESSURE: 130 MMHG | DIASTOLIC BLOOD PRESSURE: 34 MMHG

## 2021-09-11 VITALS — SYSTOLIC BLOOD PRESSURE: 127 MMHG | DIASTOLIC BLOOD PRESSURE: 44 MMHG

## 2021-09-11 VITALS — SYSTOLIC BLOOD PRESSURE: 120 MMHG | DIASTOLIC BLOOD PRESSURE: 38 MMHG

## 2021-09-11 VITALS — SYSTOLIC BLOOD PRESSURE: 123 MMHG | DIASTOLIC BLOOD PRESSURE: 43 MMHG

## 2021-09-11 VITALS — DIASTOLIC BLOOD PRESSURE: 72 MMHG | SYSTOLIC BLOOD PRESSURE: 129 MMHG

## 2021-09-11 LAB
%HYPO/RBC NFR BLD AUTO: (no result) %
ABSOLUTE BASOPHILS: 0 THOU/UL (ref 0–0.2)
ABSOLUTE EOSINOPHILS: 0 THOU/UL (ref 0–0.7)
ABSOLUTE MONOCYTES: 0.1 THOU/UL (ref 0–1.2)
ALBUMIN SERPL-MCNC: 2 G/DL (ref 3.4–5)
ALP SERPL-CCNC: 136 U/L (ref 46–116)
ALT SERPL-CCNC: 47 U/L (ref 30–65)
ANION GAP SERPL CALC-SCNC: 2 MMOL/L (ref 7–16)
AST SERPL-CCNC: 24 U/L (ref 15–37)
BASO STIPL BLD QL SMEAR: (no result)
BASOPHILS NFR BLD AUTO: 0.2 %
BILIRUB SERPL-MCNC: 0.3 MG/DL
BUN SERPL-MCNC: 50 MG/DL (ref 7–18)
CALCIUM SERPL-MCNC: 8.5 MG/DL (ref 8.5–10.1)
CHLORIDE SERPL-SCNC: 100 MMOL/L (ref 98–107)
CO2 SERPL-SCNC: 33 MMOL/L (ref 21–32)
CREAT SERPL-MCNC: 0.8 MG/DL (ref 0.6–1.3)
EOSINOPHIL NFR BLD: 0 %
GLUCOSE SERPL-MCNC: 181 MG/DL (ref 70–99)
GRANULOCYTES NFR BLD MANUAL: 97.6 %
HCT VFR BLD CALC: 18 % (ref 37–47)
HGB BLD-MCNC: 6 GM/DL (ref 12–15)
LYMPHOCYTES # BLD: 0.1 THOU/UL (ref 0.8–5.3)
LYMPHOCYTES NFR BLD AUTO: 1 %
MAGNESIUM SERPL-MCNC: 2.3 MG/DL (ref 1.8–2.4)
MCH RBC QN AUTO: 29.2 PG (ref 26–34)
MCHC RBC AUTO-ENTMCNC: 33.2 G/DL (ref 28–37)
MCV RBC: 87.7 FL (ref 80–100)
MONOCYTES NFR BLD: 1.2 %
MPV: 9.2 FL. (ref 7.2–11.1)
NEUTROPHILS # BLD: 8.9 THOU/UL (ref 1.6–8.1)
NUCLEATED RBCS: 0 /100WBC
PLATELET # BLD EST: (no result) 10*3/UL
PLATELET COUNT*: 119 THOU/UL (ref 150–400)
POTASSIUM SERPL-SCNC: 4.4 MMOL/L (ref 3.5–5.1)
PROT SERPL-MCNC: 4.9 G/DL (ref 6.4–8.2)
RBC # BLD AUTO: 2.05 MIL/UL (ref 4.2–5)
RDW-CV: 13.8 % (ref 10.5–14.5)
SODIUM SERPL-SCNC: 135 MMOL/L (ref 136–145)
WBC # BLD AUTO: 9.1 THOU/UL (ref 4–11)

## 2021-09-11 PROCEDURE — 30233N1 TRANSFUSION OF NONAUTOLOGOUS RED BLOOD CELLS INTO PERIPHERAL VEIN, PERCUTANEOUS APPROACH: ICD-10-PCS | Performed by: INTERNAL MEDICINE

## 2021-09-11 NOTE — NUR
HBG 6.0 CALLED TO NATHAN OCASIO , INFORMED TO CALL FAMILY AND IF FAMILY WANT
TRANSFUSION, THEN GIVE ONE UNIT. CALL PLACED TO DAUGHTER ADI AND INFORMED
OF HBG 6.0 . ADI WANT TO TALK WITH OTHER FAMILY AND PT DR MARCIAL BEFORE
DECISION IS MADE.

## 2021-09-12 VITALS — DIASTOLIC BLOOD PRESSURE: 51 MMHG | SYSTOLIC BLOOD PRESSURE: 130 MMHG

## 2021-09-12 VITALS — SYSTOLIC BLOOD PRESSURE: 141 MMHG | DIASTOLIC BLOOD PRESSURE: 44 MMHG

## 2021-09-12 VITALS — DIASTOLIC BLOOD PRESSURE: 52 MMHG | SYSTOLIC BLOOD PRESSURE: 136 MMHG

## 2021-09-12 VITALS — SYSTOLIC BLOOD PRESSURE: 149 MMHG | DIASTOLIC BLOOD PRESSURE: 40 MMHG

## 2021-09-12 VITALS — SYSTOLIC BLOOD PRESSURE: 148 MMHG | DIASTOLIC BLOOD PRESSURE: 41 MMHG

## 2021-09-12 VITALS — SYSTOLIC BLOOD PRESSURE: 148 MMHG | DIASTOLIC BLOOD PRESSURE: 44 MMHG

## 2021-09-12 LAB
ABSOLUTE BASOPHILS: 0 THOU/UL (ref 0–0.2)
ABSOLUTE EOSINOPHILS: 0 THOU/UL (ref 0–0.7)
ABSOLUTE MONOCYTES: 0.1 THOU/UL (ref 0–1.2)
ANION GAP SERPL CALC-SCNC: 4 MMOL/L (ref 7–16)
BASOPHILS NFR BLD AUTO: 0.2 %
BUN SERPL-MCNC: 59 MG/DL (ref 7–18)
CALCIUM SERPL-MCNC: 8.5 MG/DL (ref 8.5–10.1)
CHLORIDE SERPL-SCNC: 101 MMOL/L (ref 98–107)
CO2 SERPL-SCNC: 34 MMOL/L (ref 21–32)
CREAT SERPL-MCNC: 0.9 MG/DL (ref 0.6–1.3)
EOSINOPHIL NFR BLD: 0.2 %
GLUCOSE SERPL-MCNC: 206 MG/DL (ref 70–99)
GRANULOCYTES NFR BLD MANUAL: 97.2 %
HCT VFR BLD CALC: 23.6 % (ref 37–47)
HGB BLD-MCNC: 7.8 GM/DL (ref 12–15)
LYMPHOCYTES # BLD: 0.2 THOU/UL (ref 0.8–5.3)
LYMPHOCYTES NFR BLD AUTO: 1.4 %
MCH RBC QN AUTO: 29.9 PG (ref 26–34)
MCHC RBC AUTO-ENTMCNC: 33.1 G/DL (ref 28–37)
MCV RBC: 90.3 FL (ref 80–100)
MONOCYTES NFR BLD: 1 %
MPV: 9.6 FL. (ref 7.2–11.1)
NEUTROPHILS # BLD: 13.2 THOU/UL (ref 1.6–8.1)
NUCLEATED RBCS: 0 /100WBC
PLATELET COUNT*: 104 THOU/UL (ref 150–400)
POTASSIUM SERPL-SCNC: 4.8 MMOL/L (ref 3.5–5.1)
RBC # BLD AUTO: 2.61 MIL/UL (ref 4.2–5)
RDW-CV: 14.6 % (ref 10.5–14.5)
SODIUM SERPL-SCNC: 139 MMOL/L (ref 136–145)
WBC # BLD AUTO: 13.6 THOU/UL (ref 4–11)

## 2021-09-12 PROCEDURE — 5A0935A ASSISTANCE WITH RESPIRATORY VENTILATION, LESS THAN 24 CONSECUTIVE HOURS, HIGH NASAL FLOW/VELOCITY: ICD-10-PCS | Performed by: INTERNAL MEDICINE

## 2021-09-13 VITALS — DIASTOLIC BLOOD PRESSURE: 41 MMHG | SYSTOLIC BLOOD PRESSURE: 153 MMHG

## 2021-09-13 VITALS — DIASTOLIC BLOOD PRESSURE: 47 MMHG | SYSTOLIC BLOOD PRESSURE: 143 MMHG

## 2021-09-13 VITALS — SYSTOLIC BLOOD PRESSURE: 137 MMHG | DIASTOLIC BLOOD PRESSURE: 36 MMHG

## 2021-09-13 VITALS — DIASTOLIC BLOOD PRESSURE: 96 MMHG | SYSTOLIC BLOOD PRESSURE: 140 MMHG

## 2021-09-13 VITALS — SYSTOLIC BLOOD PRESSURE: 142 MMHG | DIASTOLIC BLOOD PRESSURE: 42 MMHG

## 2021-09-13 VITALS — DIASTOLIC BLOOD PRESSURE: 50 MMHG | SYSTOLIC BLOOD PRESSURE: 138 MMHG

## 2021-09-13 LAB
ALBUMIN SERPL-MCNC: 2.4 G/DL (ref 3.4–5)
ALP SERPL-CCNC: 123 U/L (ref 46–116)
ALT SERPL-CCNC: 41 U/L (ref 30–65)
ANION GAP SERPL CALC-SCNC: 4 MMOL/L (ref 7–16)
AST SERPL-CCNC: 22 U/L (ref 15–37)
BILIRUB SERPL-MCNC: 0.4 MG/DL
BUN SERPL-MCNC: 54 MG/DL (ref 7–18)
CALCIUM SERPL-MCNC: 8.7 MG/DL (ref 8.5–10.1)
CHLORIDE SERPL-SCNC: 104 MMOL/L (ref 98–107)
CO2 SERPL-SCNC: 35 MMOL/L (ref 21–32)
CREAT SERPL-MCNC: 0.7 MG/DL (ref 0.6–1.3)
GLUCOSE SERPL-MCNC: 44 MG/DL (ref 70–99)
HCT VFR BLD CALC: 23.5 % (ref 37–47)
HGB BLD-MCNC: 7.6 GM/DL (ref 12–15)
MCH RBC QN AUTO: 28.3 PG (ref 26–34)
MCHC RBC AUTO-ENTMCNC: 32.2 G/DL (ref 28–37)
MCV RBC: 87.9 FL (ref 80–100)
MPV: 9.8 FL. (ref 7.2–11.1)
PLATELET COUNT*: 110 THOU/UL (ref 150–400)
POTASSIUM SERPL-SCNC: 4.3 MMOL/L (ref 3.5–5.1)
PROT SERPL-MCNC: 5.7 G/DL (ref 6.4–8.2)
RBC # BLD AUTO: 2.67 MIL/UL (ref 4.2–5)
RDW-CV: 14.4 % (ref 10.5–14.5)
SODIUM SERPL-SCNC: 143 MMOL/L (ref 136–145)
WBC # BLD AUTO: 14.2 THOU/UL (ref 4–11)

## 2021-09-13 PROCEDURE — 5A0945A ASSISTANCE WITH RESPIRATORY VENTILATION, 24-96 CONSECUTIVE HOURS, HIGH NASAL FLOW/VELOCITY: ICD-10-PCS | Performed by: INTERNAL MEDICINE

## 2021-09-13 NOTE — NUR
PATIENT HAS REMAINED ORIENTED X4 YET LETHARGIC AND UNMOTIVATED THIS SHIFT.
PATIENT ONLY AGREEING TO TAKE "NECESSARY MEDICATIONS" AND REFUSES ALL OTHERS.
NO INSULIN GIVEN THIS SHIFT, AS PATIENT'S GLUCOSE WITH MORNING LABS WAS 44 AND
PATIENT HAS BEEN REFUSING TO EAT. SHE DID HOWEVER REQUEST AN ENSURE AND DID
TAKE A FEW DRINKS WHEN PROVIDED. MULTIPLE BRUISING NOTED ON PATIENT'S BODY.
URINARY CATHETER IN PLACE TO DD, YELLOW URINE IN COLLECTION BAG. PATIENT
CURRENTLY ON HEATED HIGH FLOW CANNULA DUE TO SATURATIONS LOWERING TO AROUND
85-87% ON 15L NASAL CANNULA EARLIER THIS EVENING. PATIENT ALSO REQUESTING TO
SEE DAUGHTERS AND THEY ARE BOTH AT BEDSIDE AT THIS TIME. (PERMISSION GIVEN BY
HOUSE SUPERVISOR). NO DIARRHEA NOTED THIS SHIFT. CALL LIGHT AND FREQUENTLY
USED ITEMS WITHIN REACH.

## 2021-09-13 NOTE — EKG
[place] [address]
Phone:  [phone]                     ELECTROCARDIOGRAM REPORT      
_______________________________________________________________________________
 
Name:         FRANDY DIAZ           Room:          [address] [place] 
[facility].#:    [id number]     Account #:     [id number]  
Admission:    21    Attend Phys:   Jasvir Ordonez
Discharge:                Date of Birth: [birth year]  
Date of Service: 21 1311  Report #:      [id number]
        [id number]
_______________________________________________________________________________
THIS REPORT FOR:  //name//                      
 
                          [facility]
                                       
Test Date:    2021               Test Time:    13:11:23
Pat Name:     FRANDY DIAZ               Department:   
Patient ID:   [id number]            Room:         [facility]
Gender:       F                        Technician:   KF
:          [id number]               Requested By: Tip Penn
Order Number: [id number]    Reading MD:   Tip Penn
                                 Measurements
Intervals                              Axis          
Rate:         62                       P:            6
CA:           160                      QRS:          -69
QRSD:         125                      T:            138
QT:           469                                    
QTc:          477                                    
                           Interpretive Statements
Sinus rhythm
RBBB and LAFB
Abnormal T, consider ischemia, lateral leads
Compared to ECG 09/10/2021 10:14:09
 
 
Atrial premature complex(es) no longer present
Electronically Signed On 2021 13:18:22 CDT by Tip Penn
[email or web address]
 
 
 
 
 
 
 
 
 
 
 
 
 
 
 
 
 
  <ELECTRONICALLY SIGNED>
                                           By: Tip Penn MD, [facility]      
  21     1318
D: 21   _____________________________________
T: 21   Tip Penn MD, [facility]        /EPI

## 2021-09-13 NOTE — NUR
PLAN OF CARE:
PHYSICIAN INFORMS OF PLAN TO RE-CONSULT INPT ARU AND PT/OT FOR EVALS. PT NOW
ON 6L O2 AND BIPAP PRN. CM WILL REMAIN AVAILABLE TO ASSIST AND FOLLOW AS
NEEDED. CM WILL REMAIN AVAILABLE TO ASSIST AND FOLLOW AS NEEDED.

## 2021-09-13 NOTE — CON
[facility] 
[address] [place]  [address]                    CONSULTATION                  
_______________________________________________________________________________
 
Name:       FRANDY DIAZ            Room:           [address] [place]  
M.R.#:  [id number]      Account #:      [id number]  
Admission:  09/06/21     Attend Phys:    ABNER Ocampo
Discharge:               Date of Birth:  [birth year]  
         Report #: [id number]
                                                                     [id number]
_______________________________________________________________________________
THIS REPORT FOR:  
 
cc:  Roberta Martins MD, Sarah Beth MD Vardakis, Gregory DO ~
 
 
DATE OF CONSULTATION: 09/11/2021
 
REFERRING PHYSICIAN:  Obed Oneil MD
 
REASON FOR CONSULTATION:  Rectal bleeding.
 
IMPRESSION:
1.  Overt hematochezia, most compatible with lower gastrointestinal bleed -- 
suspect diverticular bleed.
2.  Severe COVID pneumonia with associated high oxygen requirements (the patient
is on high flow oxygen at this time with borderline O2 saturation).
3.  Anemia secondary to the same plus pneumonia.
4.  Underlying chronic obstructive pulmonary disease.
5.  Underlying aortic stenosis.
6.  Atrial fibrillation, new onset, for which the patient was placed on 
anticoagulation.
7.  Recent pulmonary embolus for which the patient was placed on 
anticoagulation.
 
RECOMMENDATIONS:
1.  I agree with all anticoagulation be discontinued at this time.
2.  The patient is a poor candidate at this time for endoscopic evaluation as 
her oxygen requirements are very high.  As such, I would monitor for any 
evidence for rebleeding.  If she rebleeds, we will proceed with a stat tagged 
red blood cell scan.
3.  Consideration for IVC filter may be in order due to the patient's pulmonary 
embolus.
4.  We will need to avoid all anticoagulation at this time, even in the face of 
her pulmonary embolus and her atrial fibrillation.
5.  We will follow the patient expectantly at this time without proceeding with 
any endoscopic studies.  I have discussed plans with the patient as well as her 
daughter at the bedside and they are agreeable to the same.
 
HISTORY OF PRESENT ILLNESS:  This is an 81-year-old white female who is 
currently hospitalized for severe COVID pneumonia for which she is on high flow 
oxygen at this time.  She has already been treated and doing well from the same,
but still has high oxygen requirements.  We were consulted to see her because of
some overt rectal bleeding, which occurred almost 2 nights ago.  She passed 
quite a bit of bright red blood per rectum.  There has not been any history of 
 
 
 
[place] [address]                    CONSULTATION                  
_______________________________________________________________________________
 
Name:       FRANDY DIAZ            Room:           [address] [place]  
[facility]#:  [id number]      Account #:      [id number]  
Admission:  09/06/21     Attend Phys:    ABNER Ocampo
Discharge:               Date of Birth:  [birth year]  
         Report #: [id number]
                                                                     [id number]
_______________________________________________________________________________
 
 
any problems related to her upper or lower GI tract, although the patient has 
had bouts of diverticulitis in the past.  She has actually been seen by me in 
the past and underwent upper and lower endoscopy back in 2014 for abdominal pain
and diarrhea and was found to have mild erosive gastritis and her colonoscopy 
revealed diverticular disease within the moderate sigmoid diverticulosis.  She 
has been followed by Dr. Cutler and Tk Gutierrez NP at [facility] and seems to have everything lined up with regards to her 
bowels or bowel frequency.  She is currently fairly sleepy, but her history is 
obtained from her daughter at this time.  She has not been having a history of 
any problems referable to her upper or lower GI tract and her bowel frequency 
has been good.
 
ALLERGIES:  CIPRO AND FLAGYL.
 
MEDICATIONS:  Upon admission are glimepiride, atorvastatin, amlodipine, aspirin,
furosemide, gabapentin, hydralazine, levothyroxine, lisinopril, lorazepam, 
omeprazole, ondansetron, a probiotic, pioglitazone and tramadol.
 
CURRENT MEDICATIONS:  At this time include methylprednisolone 40 mg twice daily,
pantoprazole 40 mg twice daily, lorazepam, levalbuterol inhalers, amiodarone, 
insulin, midodrine, montelukast, levofloxacin, vitamin D, ascorbic acid, zinc, 
fluticasone nasal spray, triamcinolone topical, atorvastatin, aspirin, lorazepam
and Xarelto, but since discontinued.
 
PAST MEDICAL HISTORY:  Remarkable for hypertension, diabetes, aortic stenosis.  
She has history of peripheral neuropathy, hypothyroidism, anxiety, depression.  
She has a history of chronic anemia.  She had previous cholecystectomy, neck 
surgery as well.
 
SOCIAL HISTORY:  The patient does not smoke or drink.
 
FAMILY HISTORY:  Negative.
 
PHYSICAL EXAMINATION:
GENERAL:  Revealed an ill-appearing 81-year-old white female who is in no 
distress.  Her oxygen saturation is borderline at 91 on high flow oxygen.
CARDIOVASCULAR:  Her cardiopulmonary examination revealed atrial fibrillation 
with controlled rate.  She has aortic stenosis.
LUNGS:  Reveal diminished breath sounds and wheezes throughout.
ABDOMEN:  Soft and nontender.  No rebound or guarding noted.
 
LABORATORY DATA:  From today revealed white count of 9.1, hemoglobin 6.0, 
platelet count 119,000, MCV is 87.7 and RDW is 13.8.  Her sodium is 135, 
potassium 4.4, chloride 100, bicarbonate is 33.  Her BUN is 50, creatinine 0.84,
 
 
 
[place] [address]                    CONSULTATION                  
_______________________________________________________________________________
 
Name:       FRANDY DIAZ            Room:           [address] [place]  
[id number]      Account #:      [id number]  
Admission:  09/06/21     Attend Phys:    ABNER Ocampo
Discharge:               Date of Birth:  [birth year]  
         Report #: [id number]
                                                                     [id number]
_______________________________________________________________________________
 
 
GFR 69.  Total bilirubin 0.3, alkaline phosphatase 136, AST 24, ALT 47, albumin 
is 2.0.  By comparison, her hemoglobin on 08/04, her white count was 7.9, 
hemoglobin 10.7, platelet count 331,000.  At that time, her BUN and creatinine 
were 23 and 1.0 respectively.  Liver function tests were all normal.
 
CT scan of the abdomen and pelvis performed on 09/08 was reviewed and revealed a
normal liver and spleen.  Gallbladder was removed.  She does have a low density 
cystic lesion at the level of the duodenum, which may represent a duodenal 
diverticulum versus a pancreatic cystic mass.  There appeared to be mild 
intrahepatic ductal dilation.  There is fluid noted within the colon without 
evidence for bowel wall thickening.  She does have diverticular disease.
 
DISCUSSION:  At the present time, the patient has had some bleeding.  She is not
the best candidate for any endoscopic studies.  We will hold off on anything at 
this time unless she has any overt hematemesis or hematochezia.  If so, we will 
proceed with upper endoscopy if necessary and a tagged red blood cell scan if 
there is overt bleeding without evidence for hematemesis.  We will continue to 
follow the patient while in the hospital.
 
 
 
 
 
 
 
 
 
 
 
 
 
 
 
 
 
 
 
 
 
 
 
 
<ELECTRONICALLY SIGNED>
                                        By:  Palomo Blue DO          
09/13/21     0722
D: 09/11/21 1132_______________________________________
T: 09/11/21 Moe Blue DO             /nt

## 2021-09-13 NOTE — NUR
ASSUMED CARE OF PT AT 1900. PT IS ORIENTED BUT LETHARGIC AND SLEEPING. VSS.
CAYLA. PT HAS TIMMONS IN PLACE. PT IS ON 15 LITERS O2. PT IS V PACED ON THE
TELEMETRY. PT IS RESTING COMFORTABLY IN BED. RESPIRATIONS ARE EVEN AND
NONLABORED. WILL CONTINUE TO MONITOR PT.

## 2021-09-14 VITALS — SYSTOLIC BLOOD PRESSURE: 113 MMHG | DIASTOLIC BLOOD PRESSURE: 22 MMHG

## 2021-09-14 VITALS — SYSTOLIC BLOOD PRESSURE: 120 MMHG | DIASTOLIC BLOOD PRESSURE: 51 MMHG

## 2021-09-14 VITALS — SYSTOLIC BLOOD PRESSURE: 151 MMHG | DIASTOLIC BLOOD PRESSURE: 45 MMHG

## 2021-09-14 VITALS — SYSTOLIC BLOOD PRESSURE: 127 MMHG | DIASTOLIC BLOOD PRESSURE: 45 MMHG

## 2021-09-14 VITALS — DIASTOLIC BLOOD PRESSURE: 37 MMHG | SYSTOLIC BLOOD PRESSURE: 126 MMHG

## 2021-09-14 LAB
ALBUMIN SERPL-MCNC: 2.3 G/DL (ref 3.4–5)
ALP SERPL-CCNC: 108 U/L (ref 46–116)
ALT SERPL-CCNC: 41 U/L (ref 30–65)
ANION GAP SERPL CALC-SCNC: 4 MMOL/L (ref 7–16)
AST SERPL-CCNC: 21 U/L (ref 15–37)
BILIRUB SERPL-MCNC: 0.3 MG/DL
BUN SERPL-MCNC: 52 MG/DL (ref 7–18)
CALCIUM SERPL-MCNC: 8.3 MG/DL (ref 8.5–10.1)
CHLORIDE SERPL-SCNC: 104 MMOL/L (ref 98–107)
CO2 SERPL-SCNC: 34 MMOL/L (ref 21–32)
CREAT SERPL-MCNC: 0.9 MG/DL (ref 0.6–1.3)
GLUCOSE SERPL-MCNC: 287 MG/DL (ref 70–99)
POTASSIUM SERPL-SCNC: 4.5 MMOL/L (ref 3.5–5.1)
PROT SERPL-MCNC: 5.4 G/DL (ref 6.4–8.2)
SODIUM SERPL-SCNC: 142 MMOL/L (ref 136–145)

## 2021-09-14 NOTE — NUR
PLAN OF CARE:
PHYSICIAN INFORMS THAT PT IS NOT MEDICALLY STABLE TO D/C TO INPT ARU TODAY AS
PT HAS INCREASED OXYGEN NEEDS TODAY. PT REQUIRING 30L HEATED HF O2 AT THIS
TIME. CM D/C PLANNING NEEDS ARE TBD AT THIS TIME. CM WILL REMAIN AVAILABLE TO
ASSIST AND FOLLOW AS NEEDED.

## 2021-09-14 NOTE — NUR
ASSUMED CARE OF PT 09/13/21 AT APPROX 1915. LISPRO DOSE AND FULL DOSE
GLARGINE REFUSED BY PT'S DAUGHTER (EDUCATION GIVEN). AT APPROX 2039 PHYSICIAN
CONTACT FOR SALINE NASAL SPRAY AND INSULINE DOSE TO BE ADMINISTERED. ORDER
GIVEN TO ADMINISTER 13 UNITS OF THE 25 UNITS GLARGINE ON ORDER, GIVEN AS
ORDERED (PT'S DAUGHTERS IN AGREEMENT). PT V-PACED ON TELE MONITOR. ATIVAN IV
REQUESTED AND GIVEN AS ORDERED. PT ON HEATED HF NC. URINARY CATHETER IN PLACE.
REPOSITIONED Q2H. REPORT GIVEN AND CARE TRANSFERED TO DAY [facility] NURSE AT APPROX
0720.

## 2021-09-15 VITALS — DIASTOLIC BLOOD PRESSURE: 39 MMHG | SYSTOLIC BLOOD PRESSURE: 124 MMHG

## 2021-09-15 VITALS — DIASTOLIC BLOOD PRESSURE: 44 MMHG | SYSTOLIC BLOOD PRESSURE: 137 MMHG

## 2021-09-15 VITALS — SYSTOLIC BLOOD PRESSURE: 153 MMHG | DIASTOLIC BLOOD PRESSURE: 41 MMHG

## 2021-09-15 VITALS — DIASTOLIC BLOOD PRESSURE: 28 MMHG | SYSTOLIC BLOOD PRESSURE: 123 MMHG

## 2021-09-15 VITALS — SYSTOLIC BLOOD PRESSURE: 126 MMHG | DIASTOLIC BLOOD PRESSURE: 47 MMHG

## 2021-09-15 VITALS — SYSTOLIC BLOOD PRESSURE: 152 MMHG | DIASTOLIC BLOOD PRESSURE: 48 MMHG

## 2021-09-15 LAB
ALBUMIN SERPL-MCNC: 2.3 G/DL (ref 3.4–5)
ALP SERPL-CCNC: 100 U/L (ref 46–116)
ALT SERPL-CCNC: 31 U/L (ref 30–65)
ANION GAP SERPL CALC-SCNC: 5 MMOL/L (ref 7–16)
AST SERPL-CCNC: 17 U/L (ref 15–37)
BILIRUB SERPL-MCNC: 0.4 MG/DL
BUN SERPL-MCNC: 46 MG/DL (ref 7–18)
CALCIUM SERPL-MCNC: 8 MG/DL (ref 8.5–10.1)
CHLORIDE SERPL-SCNC: 106 MMOL/L (ref 98–107)
CO2 SERPL-SCNC: 33 MMOL/L (ref 21–32)
CREAT SERPL-MCNC: 0.8 MG/DL (ref 0.6–1.3)
GLUCOSE SERPL-MCNC: 317 MG/DL (ref 70–99)
GRANULOCYTES NFR BLD MANUAL: 99 %
HCT VFR BLD CALC: 20 % (ref 37–47)
HGB BLD-MCNC: 6.7 GM/DL (ref 12–15)
MAGNESIUM SERPL-MCNC: 2.4 MG/DL (ref 1.8–2.4)
MCH RBC QN AUTO: 29.8 PG (ref 26–34)
MCHC RBC AUTO-ENTMCNC: 33.4 G/DL (ref 28–37)
MCV RBC: 89.1 FL (ref 80–100)
METAMYELOCYTES NFR BLD: 1 %
MPV: 10.6 FL. (ref 7.2–11.1)
NEUTROPHILS # BLD: 11 THOU/UL (ref 1.6–8.1)
NUCLEATED RBCS: 0 /100WBC
PLATELET # BLD EST: (no result) 10*3/UL
PLATELET COUNT*: 66 THOU/UL (ref 150–400)
POTASSIUM SERPL-SCNC: 4.8 MMOL/L (ref 3.5–5.1)
PROT SERPL-MCNC: 5.2 G/DL (ref 6.4–8.2)
RBC # BLD AUTO: 2.24 MIL/UL (ref 4.2–5)
RBC MORPH BLD: NORMAL
RDW-CV: 14.8 % (ref 10.5–14.5)
SODIUM SERPL-SCNC: 144 MMOL/L (ref 136–145)
WBC # BLD AUTO: 11 THOU/UL (ref 4–11)

## 2021-09-15 PROCEDURE — B5181ZA FLUOROSCOPY OF SUPERIOR VENA CAVA USING LOW OSMOLAR CONTRAST, GUIDANCE: ICD-10-PCS | Performed by: RADIOLOGY

## 2021-09-15 PROCEDURE — B548ZZA ULTRASONOGRAPHY OF SUPERIOR VENA CAVA, GUIDANCE: ICD-10-PCS | Performed by: RADIOLOGY

## 2021-09-15 PROCEDURE — 02HV33Z INSERTION OF INFUSION DEVICE INTO SUPERIOR VENA CAVA, PERCUTANEOUS APPROACH: ICD-10-PCS | Performed by: RADIOLOGY

## 2021-09-15 NOTE — NUR
PATIENT RESTING IN BED, DAUGHTER AT BEDSIDE, INVOLVED WITH CARES.  TOLERATING
25L HEATED HIGH FLOW, FIO2 %.  TIMMONS SECURELY INPLACE TO DEPENDENT
DRAINAGE.  BARRIER CREAM APPLIED TO BOTTOM.  PICC TO RIGHT UPPER ARM, DOUBLE
LUMEN, PATENT.  RIGHT ARM, TOES TO LEFT AND RIGHT FEET WITH BRUISING.  LUNG
SOUNDS DIMINISHED.  BLOOD SUGARS MONITORED, INSULIN REFUSED BY DAUGHTER.
PACEMAKER TO RIGHT UPPER CHEST. AV PACED.  BED IN LOW/LOCKED POSITION.  CALL
LIGHT WITHIN REACH.  NO QUESTIONS OR CONCERNS VOICED.

## 2021-09-15 NOTE — NUR
PT HBG 6.7 INFORMED DAUGHTER AT BEDSIDE AND SHE WILL CALL ADI MUHAMMAD OTHER
SIBLING AND TO TALK WITH DR MARCIAL. PT RECIEVED MORPHINE FOR AIR HUNGER 1X THIS
SHIFT AND ATIVAN ONCE THIS SHIFT. SAT DROPS TO 76 % IF PT REMOVED HF 02. WILL
CONITINUE WITH CURRENT PLAN OF CARE AND WILL REPORT CHANGES

## 2021-09-16 VITALS — SYSTOLIC BLOOD PRESSURE: 167 MMHG | DIASTOLIC BLOOD PRESSURE: 61 MMHG

## 2021-09-16 VITALS — DIASTOLIC BLOOD PRESSURE: 42 MMHG | SYSTOLIC BLOOD PRESSURE: 123 MMHG

## 2021-09-16 NOTE — NUR
0800:  FAMILY HAS REQUESTED NO ASSESSMENT, VITAL SIGNS, MEDICATIONS OR VITAL
SIGNS ON PATIENT.  PATIENT HAS BEEN SWITCHED TO COMFORT CARE.  PATIENT ON 2L
OXYGEN, NASAL CANNULA.  LORAZEPAM 2MG AND MORPHINE 2MG GIVEN.  WILL CONTINUE
TO MONITOR.
 
0921:  THIS RN REQUESTED FROM FAMILY MEMBER TO COME TO PATIENT'S BEDSIDE.
THIS RN AND CHARGE NURSE TO BEDSIDE.  PATIENT ASSESSED FOR SIGNS OF LIFE.
TIME OFF DEATH AT 0921 AS WITNESSED BY THIS RN AND CHARGE NURSEJAGJIT.  DR. MARCIAL AND DR. SOLIS NOTIFIED.  DAUGHTERS AT BEDSIDE.  WILL REMAIN AVAILABLE TO
FAMILY.
HOUSE SUPERVISOR NOTIFIED.
 
0951:  [employer] NOTIFIED.

## 2021-09-16 NOTE — NUR
THIS RN AND PCA INTO PATIENT'S ROOM.  PICC TO RIGHT UPPER ARM DC'D.  TIMMONS
REMOVED, TUBING INTACT.  PATIENT PLACED IN BODY BAG.
 
[facility] CALLED AT THIS TIME.
 
SECURITY TO ROOM WITH STRETCHER.  BODY PLACED ON STRETCHER.  COVERED PLACED
OVER BODY.  SECURITY WITH BODY TO [facility].

## 2024-07-20 NOTE — NUR
DETAILED REPORT GIVEN TO HAL BARNHART RN FOR CONTINUATION OF CARE. Reason for Consultation: PMB    Patient Care Team:  Rut Pierce APRN as PCP - General (Nurse Practitioner)  Austin Brooks MD as Cardiologist (Cardiology)    Chief complaint PMB, fibroid uterus    Subjective .     History of present illness:  66 yo  hospitalized for multiple medical conditions. She had a CT and US done while here which showed a fibroid uterus. She began having vaginal bleeding while here. She reports occasional vaginal spotting several years ago. No other significant bleeding since menopause. Last pap approximately 40 years ago.     Objective     Vital Signs   Vitals:    24 1110 24 1317 24 1509 24 1512   BP:  109/58     BP Location:  Right arm     Patient Position:  Lying     Pulse: 92 93 90 94   Resp:    Temp:  98.2 °F (36.8 °C)     TempSrc:  Oral     SpO2: 99% 97% 96% 99%   Weight:       Height:         Temp (24hrs), Av.1 °F (36.7 °C), Min:97.7 °F (36.5 °C), Max:98.6 °F (37 °C)      Physical Exam:       General Appearance:    Alert, cooperative, in no acute distress   Head:    Normocephalic, without obvious abnormality, atraumatic   Eyes:         PERRLA   Abdomen:     Soft, tender over lower abdomen, no rebound or guarding   Genitalia:    deferred     Lab Results:  Lab Results (last 48 hours)       Procedure Component Value Units Date/Time    POC Glucose 4x Daily Before Meals & at Bedtime [[id number]]  (Abnormal) Collected: 24 1113    Specimen: Blood Updated: 24 1115     Glucose 154 mg/dL      Comment: Serial Number: [id number]:  [id number]       POC Glucose 4x Daily Before Meals & at Bedtime [[id number]]  (Normal) Collected: 24 0712    Specimen: Blood Updated: 24 0714     Glucose 77 mg/dL      Comment: Serial Number: [id number]:  [id number]       Magnesium [[id number]]  (Normal) Collected: 24 0140    Specimen: Blood Updated: 24 0229     Magnesium 2.4 mg/dL     Phosphorus [[id number]]  (Normal)  Collected: 07/20/24 0140    Specimen: Blood Updated: 07/20/24 0225     Phosphorus 2.7 mg/dL     Comprehensive Metabolic Panel [[id number]]  (Abnormal) Collected: 07/20/24 0140    Specimen: Blood Updated: 07/20/24 0225     Glucose 80 mg/dL      BUN 21 mg/dL      Creatinine 0.86 mg/dL      Sodium 130 mmol/L      Potassium 3.8 mmol/L      Chloride 92 mmol/L      CO2 32.7 mmol/L      Calcium 8.2 mg/dL      Total Protein 5.3 g/dL      Albumin 1.9 g/dL      ALT (SGPT) 32 U/L      AST (SGOT) 64 U/L      Alkaline Phosphatase 164 U/L      Total Bilirubin 1.4 mg/dL      Globulin 3.4 gm/dL      A/G Ratio 0.6 g/dL      BUN/Creatinine Ratio 24.4     Anion Gap 5.3 mmol/L      eGFR 73.2 mL/min/1.73     Narrative:      GFR Normal >60  Chronic Kidney Disease <60  Kidney Failure <15      CBC & Differential [[id number]]  (Abnormal) Collected: 07/20/24 0140    Specimen: Blood Updated: 07/20/24 0158    Narrative:      The following orders were created for panel order CBC & Differential.  Procedure                               Abnormality         Status                     ---------                               -----------         ------                     CBC Auto Differential[[id number]]        Abnormal            Final result                 Please view results for these tests on the individual orders.    CBC Auto Differential [[id number]]  (Abnormal) Collected: 07/20/24 0140    Specimen: Blood Updated: 07/20/24 0158     WBC 7.20 10*3/mm3      RBC 3.06 10*6/mm3      Hemoglobin 9.4 g/dL      Hematocrit 30.1 %      MCV 98.4 fL      MCH 30.7 pg      MCHC 31.2 g/dL      RDW 15.8 %      RDW-SD 56.7 fl      MPV 11.0 fL      Platelets 167 10*3/mm3      Neutrophil % 74.8 %      Lymphocyte % 11.0 %      Monocyte % 10.8 %      Eosinophil % 2.5 %      Basophil % 0.3 %      Immature Grans % 0.6 %      Neutrophils, Absolute 5.39 10*3/mm3      Lymphocytes, Absolute 0.79 10*3/mm3      Monocytes, Absolute 0.78 10*3/mm3      Eosinophils, Absolute 0.18  10*3/mm3      Basophils, Absolute 0.02 10*3/mm3      Immature Grans, Absolute 0.04 10*3/mm3      nRBC 0.0 /100 WBC     POC Glucose Once [[id number]]  (Abnormal) Collected: 07/19/24 2101    Specimen: Blood Updated: 07/19/24 2102     Glucose 177 mg/dL      Comment: Serial Number: [id number]:  [id number]       POC Glucose Once [[id number]]  (Abnormal) Collected: 07/19/24 1607    Specimen: Blood Updated: 07/19/24 1644     Glucose 171 mg/dL      Comment: Serial Number: [id number]:  [id number]       POC Glucose Once [[id number]]  (Abnormal) Collected: 07/19/24 1056    Specimen: Blood Updated: 07/19/24 1604     Glucose 114 mg/dL      Comment: Serial Number: [id number]:  [id number]       POC Glucose Once [[id number]]  (Normal) Collected: 07/19/24 0659    Specimen: Blood Updated: 07/19/24 1542     Glucose 92 mg/dL      Comment: Serial Number: [id number]:  [id number]       Magnesium [[id number]]  (Abnormal) Collected: 07/19/24 0121    Specimen: Blood Updated: 07/19/24 0155     Magnesium 1.5 mg/dL     Phosphorus [[id number]]  (Normal) Collected: 07/19/24 0121    Specimen: Blood Updated: 07/19/24 0155     Phosphorus 2.7 mg/dL     Comprehensive Metabolic Panel [[id number]]  (Abnormal) Collected: 07/19/24 0121    Specimen: Blood Updated: 07/19/24 0155     Glucose 82 mg/dL      BUN 20 mg/dL      Creatinine 0.96 mg/dL      Sodium 134 mmol/L      Potassium 3.2 mmol/L      Chloride 93 mmol/L      CO2 34.0 mmol/L      Calcium 8.2 mg/dL      Total Protein 5.6 g/dL      Albumin 2.2 g/dL      ALT (SGPT) 25 U/L      AST (SGOT) 31 U/L      Alkaline Phosphatase 136 U/L      Total Bilirubin 1.6 mg/dL      Globulin 3.4 gm/dL      A/G Ratio 0.6 g/dL      BUN/Creatinine Ratio 20.8     Anion Gap 7.0 mmol/L      eGFR 64.2 mL/min/1.73     Narrative:      GFR Normal >60  Chronic Kidney Disease <60  Kidney Failure <15      CBC & Differential [[id number]]  (Abnormal) Collected: 07/19/24 0121    Specimen: Blood Updated: 07/19/24 0127     Narrative:      The following orders were created for panel order CBC & Differential.  Procedure                               Abnormality         Status                     ---------                               -----------         ------                     CBC Auto Differential[[id number]]        Abnormal            Final result                 Please view results for these tests on the individual orders.    CBC Auto Differential [[id number]]  (Abnormal) Collected: 07/19/24 0121    Specimen: Blood Updated: 07/19/24 0127     WBC 8.29 10*3/mm3      RBC 3.26 10*6/mm3      Hemoglobin 9.9 g/dL      Hematocrit 31.8 %      MCV 97.5 fL      MCH 30.4 pg      MCHC 31.1 g/dL      RDW 15.8 %      RDW-SD 56.2 fl      MPV 10.8 fL      Platelets 184 10*3/mm3      Neutrophil % 76.3 %      Lymphocyte % 11.6 %      Monocyte % 8.2 %      Eosinophil % 3.0 %      Basophil % 0.2 %      Immature Grans % 0.7 %      Neutrophils, Absolute 6.32 10*3/mm3      Lymphocytes, Absolute 0.96 10*3/mm3      Monocytes, Absolute 0.68 10*3/mm3      Eosinophils, Absolute 0.25 10*3/mm3      Basophils, Absolute 0.02 10*3/mm3      Immature Grans, Absolute 0.06 10*3/mm3      nRBC 0.0 /100 WBC     POC Glucose 4x Daily Before Meals & at Bedtime [[id number]]  (Normal) Collected: 07/18/24 2206    Specimen: Blood Updated: 07/18/24 2208     Glucose 90 mg/dL      Comment: Serial Number: [id number]:  [id number]               Radiology Results:  Imaging Results (Last 72 Hours)       Procedure Component Value Units Date/Time    XR Chest 1 View [[id number]] Collected: 07/19/24 0714     Updated: 07/19/24 0718    Narrative:      XR CHEST 1 VW    Date of Exam: 7/19/2024 6:05 AM EDT    Indication: Chest tube    Comparison: 7/18/2024    Findings:  Stable cardiomegaly with bilateral perihilar and bibasilar airspace disease suggesting pulmonary edema. No definite pneumothorax. Small bilateral pleural effusions left greater than right not significantly changed. No  residual chest tube identified. The   osseous structures appear intact.      Impression:      Impression:  1. No definite residual pneumothorax.  2. Stable cardiomegaly with bilateral perihilar and bibasilar airspace disease suggesting pulmonary edema.  3. Small bilateral pleural effusions left greater than right not significantly changed.        Electronically Signed: Mina Wheeler MD    7/19/2024 7:16 AM EDT    Workstation ID: [id number]    XR Chest 1 View [[id number]] Collected: 07/18/24 1140     Updated: 07/18/24 1144    Narrative:      DATE OF EXAM:  7/18/2024 5:54 AM     PROCEDURE:  XR CHEST 1 VW-     INDICATIONS:  CT; I48.91-Unspecified atrial fibrillation; I50.9-Heart failure,  unspecified; A41.9-Sepsis, unspecified organism; N39.0-Urinary tract  infection, site not specified; N17.9-Acute kidney failure, unspecified;  L03.116-Cellulitis of left lower limb; L03.115-Cellulitis of right lower  limb; E87.5-Hyperkalemia; J18.9-Pneumonia, unspecified organism;  B34.8-Other viral infections of unspecified site; J44.9     COMPARISON:  Chest radiograph 7/17/2024     TECHNIQUE:   Single radiographic AP view of the chest was obtained.     FINDINGS:  Stable tiny right apical pneumothorax measuring 3 mm. Stable  cardiomegaly. Similar bilateral perihilar and bibasilar airspace  opacities in the setting of small pleural effusions. Findings could  reflect atelectasis versus infectious airspace process. No significant  edema or left pneumothorax. Previously noted right thoracotomy catheter  has been removed. Aortic atherosclerotic disease. Degenerative related  osseous changes.       Impression:      Stable tiny right apical pneumothorax status post thoracotomy tube  removal. Otherwise stable radiographic appearance of the chest.        Electronically Signed By-Jerzy White MD On:7/18/2024 11:41 AM               Assessment & Plan     Principal Problem:    Atrial fibrillation with RVR  Active Problems:    Primary  hypertension    Morbid obesity with BMI of 40.0-44.9, adult    Right bundle branch block    Acute deep vein thrombosis (DVT) of both lower extremities    ARABELLA (acute kidney injury)    Acute hyperkalemia    Sepsis    Acute UTI    Acute systolic congestive heart failure    Rhinovirus infection    Multifocal pneumonia    Chronic respiratory failure with hypoxia, on home O2 therapy    Skin ulcer of right great toe    Skin ulcer of left great toe    SEDRICK (obstructive sleep apnea)    Aortic stenosis    Mitral regurgitation    Acute HFrEF (heart failure with reduced ejection fraction)    COPD (chronic obstructive pulmonary disease)      PMB-Plan for [facility]SC D&C tomorrow if cleared by cardiology. Discussed plan with patient and her daughter. They agree to proceed.    I discussed the patients findings and my recommendations with patient    Sosa Newell MD  07/20/24  17:00 EDT